# Patient Record
Sex: FEMALE | Race: WHITE | Employment: OTHER | URBAN - METROPOLITAN AREA
[De-identification: names, ages, dates, MRNs, and addresses within clinical notes are randomized per-mention and may not be internally consistent; named-entity substitution may affect disease eponyms.]

---

## 2017-02-28 ENCOUNTER — HOSPITAL ENCOUNTER (OUTPATIENT)
Dept: LAB | Age: 62
Discharge: HOME | End: 2017-02-28
Attending: NURSE PRACTITIONER | Admitting: NURSE PRACTITIONER

## 2017-02-28 LAB
ALBUMIN SERPL-MCNC: 4.9 GM/DL (ref 3.4–5)
ALP BLD-CCNC: 127 IU/L (ref 40–128)
ALT SERPL-CCNC: 59 U/L (ref 10–40)
ANION GAP SERPL CALCULATED.3IONS-SCNC: 12 MMOL/L (ref 4–16)
AST SERPL-CCNC: 38 IU/L (ref 15–37)
BILIRUB SERPL-MCNC: 0.2 MG/DL (ref 0–1)
BUN BLDV-MCNC: 8 MG/DL (ref 6–23)
CALCIUM SERPL-MCNC: 9.9 MG/DL (ref 8.3–10.6)
CHLORIDE BLD-SCNC: 96 MMOL/L (ref 99–110)
CHOLESTEROL: 214 MG/DL
CO2: 31 MMOL/L (ref 21–32)
CREAT SERPL-MCNC: 0.5 MG/DL (ref 0.6–1.1)
ESTIMATED AVERAGE GLUCOSE: 232 MG/DL
GFR AFRICAN AMERICAN: >60 ML/MIN/1.73M2
GFR NON-AFRICAN AMERICAN: >60 ML/MIN/1.73M2
GLUCOSE FASTING: 279 MG/DL (ref 70–99)
HBA1C MFR BLD: 9.7 % (ref 4.2–6.3)
HCT VFR BLD CALC: 45.4 % (ref 37–47)
HEMOGLOBIN: 13.7 GM/DL (ref 12.5–16)
MCH RBC QN AUTO: 26 PG (ref 27–31)
MCHC RBC AUTO-ENTMCNC: 30.2 % (ref 32–36)
MCV RBC AUTO: 86.3 FL (ref 78–100)
PDW BLD-RTO: 13.5 % (ref 11.7–14.9)
PLATELET # BLD: 246 K/CU MM (ref 140–440)
PMV BLD AUTO: 10.1 FL (ref 7.5–11.1)
POTASSIUM SERPL-SCNC: 5.1 MMOL/L (ref 3.5–5.1)
RBC # BLD: 5.26 M/CU MM (ref 4.2–5.4)
SODIUM BLD-SCNC: 139 MMOL/L (ref 135–145)
TOTAL PROTEIN: 7.1 GM/DL (ref 6.4–8.2)
TSH HIGH SENSITIVITY: 2.77 UIU/ML (ref 0.27–4.2)
WBC # BLD: 8.8 K/CU MM (ref 4–10.5)

## 2017-03-07 ENCOUNTER — HOSPITAL ENCOUNTER (OUTPATIENT)
Dept: MAMMOGRAPHY | Facility: HOSPITAL | Age: 62
Discharge: HOME | End: 2017-03-07
Attending: FAMILY MEDICINE

## 2017-03-07 DIAGNOSIS — Z12.31 VISIT FOR SCREENING MAMMOGRAM: ICD-10-CM

## 2017-03-20 ENCOUNTER — COMMUNICATION - HEALTHEAST (OUTPATIENT)
Dept: FAMILY MEDICINE | Facility: CLINIC | Age: 62
End: 2017-03-20

## 2017-03-24 ENCOUNTER — COMMUNICATION - HEALTHEAST (OUTPATIENT)
Dept: FAMILY MEDICINE | Facility: CLINIC | Age: 62
End: 2017-03-24

## 2017-04-10 ENCOUNTER — OFFICE VISIT - HEALTHEAST (OUTPATIENT)
Dept: FAMILY MEDICINE | Facility: CLINIC | Age: 62
End: 2017-04-10

## 2017-04-10 DIAGNOSIS — M81.0 OSTEOPOROSIS: ICD-10-CM

## 2017-04-10 DIAGNOSIS — Z00.00 HEALTH CARE MAINTENANCE: ICD-10-CM

## 2017-04-10 DIAGNOSIS — K64.4 EXTERNAL HEMORRHOID: ICD-10-CM

## 2017-04-10 DIAGNOSIS — F33.9 MAJOR DEPRESSIVE DISORDER, RECURRENT EPISODE (H): ICD-10-CM

## 2017-04-10 ASSESSMENT — MIFFLIN-ST. JEOR: SCORE: 1216.63

## 2017-04-19 ENCOUNTER — RECORDS - HEALTHEAST (OUTPATIENT)
Dept: ADMINISTRATIVE | Facility: OTHER | Age: 62
End: 2017-04-19

## 2017-07-27 ENCOUNTER — HOSPITAL ENCOUNTER (OUTPATIENT)
Dept: OTHER | Age: 62
Discharge: HOME | End: 2017-07-27
Attending: NURSE PRACTITIONER | Admitting: NURSE PRACTITIONER

## 2017-07-27 LAB
ESTIMATED AVERAGE GLUCOSE: 192 MG/DL
HBA1C MFR BLD: 8.3 % (ref 4.2–6.3)

## 2017-12-12 ASSESSMENT — MIFFLIN-ST. JEOR: SCORE: 1174.11

## 2017-12-13 ENCOUNTER — ANESTHESIA - HEALTHEAST (OUTPATIENT)
Dept: SURGERY | Facility: HOSPITAL | Age: 62
End: 2017-12-13

## 2017-12-13 ENCOUNTER — SURGERY - HEALTHEAST (OUTPATIENT)
Dept: SURGERY | Facility: HOSPITAL | Age: 62
End: 2017-12-13

## 2017-12-19 ENCOUNTER — COMMUNICATION - HEALTHEAST (OUTPATIENT)
Dept: FAMILY MEDICINE | Facility: CLINIC | Age: 62
End: 2017-12-19

## 2017-12-21 ENCOUNTER — RECORDS - HEALTHEAST (OUTPATIENT)
Dept: ADMINISTRATIVE | Facility: OTHER | Age: 62
End: 2017-12-21

## 2018-01-18 ENCOUNTER — RECORDS - HEALTHEAST (OUTPATIENT)
Dept: ADMINISTRATIVE | Facility: OTHER | Age: 63
End: 2018-01-18

## 2018-03-08 ENCOUNTER — RECORDS - HEALTHEAST (OUTPATIENT)
Dept: ADMINISTRATIVE | Facility: OTHER | Age: 63
End: 2018-03-08

## 2018-03-13 ENCOUNTER — HOSPITAL ENCOUNTER (OUTPATIENT)
Dept: GENERAL RADIOLOGY | Age: 63
Discharge: HOME | End: 2018-03-13

## 2018-03-13 ENCOUNTER — AMBULATORY - HEALTHEAST (OUTPATIENT)
Dept: SCHEDULING | Facility: CLINIC | Age: 63
End: 2018-03-13

## 2018-03-13 ENCOUNTER — AMBULATORY - HEALTHEAST (OUTPATIENT)
Dept: FAMILY MEDICINE | Facility: CLINIC | Age: 63
End: 2018-03-13

## 2018-03-13 DIAGNOSIS — M85.80 OSTEOPENIA: ICD-10-CM

## 2018-03-13 DIAGNOSIS — M24.9 JOINT DERANGEMENT, SHOULDER REGION: ICD-10-CM

## 2018-03-29 ENCOUNTER — HOSPITAL ENCOUNTER (OUTPATIENT)
Dept: MAMMOGRAPHY | Facility: CLINIC | Age: 63
Discharge: HOME | End: 2018-03-29
Attending: FAMILY MEDICINE

## 2018-03-29 DIAGNOSIS — Z12.31 VISIT FOR SCREENING MAMMOGRAM: ICD-10-CM

## 2018-04-24 ENCOUNTER — COMMUNICATION - HEALTHEAST (OUTPATIENT)
Dept: FAMILY MEDICINE | Facility: CLINIC | Age: 63
End: 2018-04-24

## 2018-05-01 ENCOUNTER — HOSPITAL ENCOUNTER (OUTPATIENT)
Dept: WOMENS IMAGING | Age: 63
Discharge: HOME | End: 2018-05-01
Attending: NURSE PRACTITIONER | Admitting: NURSE PRACTITIONER

## 2018-05-01 DIAGNOSIS — Z13.820 SCREENING FOR OSTEOPOROSIS: ICD-10-CM

## 2018-05-01 DIAGNOSIS — Z12.31 VISIT FOR SCREENING MAMMOGRAM: ICD-10-CM

## 2018-05-08 ENCOUNTER — HOSPITAL ENCOUNTER (OUTPATIENT)
Dept: MRI IMAGING | Age: 63
Discharge: HOME | End: 2018-05-08

## 2018-05-08 DIAGNOSIS — M50.30 OTHER CERVICAL DISC DEGENERATION, UNSPECIFIED CERVICAL REGION: ICD-10-CM

## 2018-07-16 ENCOUNTER — OFFICE VISIT - HEALTHEAST (OUTPATIENT)
Dept: FAMILY MEDICINE | Facility: CLINIC | Age: 63
End: 2018-07-16

## 2018-07-16 DIAGNOSIS — M81.0 OSTEOPOROSIS: ICD-10-CM

## 2018-07-16 DIAGNOSIS — Z00.00 HEALTH CARE MAINTENANCE: ICD-10-CM

## 2018-07-16 DIAGNOSIS — F33.0 MILD EPISODE OF RECURRENT MAJOR DEPRESSIVE DISORDER (H): ICD-10-CM

## 2018-07-16 LAB
CHOLEST SERPL-MCNC: 229 MG/DL
FASTING STATUS PATIENT QL REPORTED: YES
FASTING STATUS PATIENT QL REPORTED: YES
GLUCOSE BLD-MCNC: 79 MG/DL (ref 70–99)
HDLC SERPL-MCNC: 69 MG/DL
LDLC SERPL CALC-MCNC: 136 MG/DL
TRIGL SERPL-MCNC: 118 MG/DL

## 2018-07-16 ASSESSMENT — MIFFLIN-ST. JEOR: SCORE: 1190.27

## 2018-08-06 ENCOUNTER — HOSPITAL ENCOUNTER (OUTPATIENT)
Dept: PHYSICAL THERAPY | Age: 63
Discharge: HOME | End: 2018-08-31

## 2018-08-06 ASSESSMENT — PAIN DESCRIPTION - ONSET: ONSET: ON-GOING

## 2018-08-06 ASSESSMENT — PAIN DESCRIPTION - FREQUENCY: FREQUENCY: CONTINUOUS

## 2018-08-06 ASSESSMENT — PAIN DESCRIPTION - ORIENTATION: ORIENTATION: RIGHT;LEFT

## 2018-08-06 ASSESSMENT — PAIN DESCRIPTION - PAIN TYPE: TYPE: CHRONIC PAIN

## 2018-08-06 ASSESSMENT — PAIN SCALES - GENERAL: PAINLEVEL_OUTOF10: 7

## 2018-08-06 ASSESSMENT — PAIN DESCRIPTION - DESCRIPTORS: DESCRIPTORS: ACHING;THROBBING

## 2018-08-06 ASSESSMENT — PAIN DESCRIPTION - PROGRESSION: CLINICAL_PROGRESSION: GRADUALLY WORSENING

## 2018-08-06 ASSESSMENT — PAIN DESCRIPTION - LOCATION: LOCATION: BACK;LEG;NECK;SHOULDER

## 2018-08-06 NOTE — PROGRESS NOTES
restricted  Mobility: Walking and Moving Around Goal Status (): At least 20 percent but less than 40 percent impaired, limited or restricted    Goals  Long term goals  Time Frame for Long term goals : 6 Weeks  Long term goal 1: Pt will improve LE strength to at least 4+/5  Long term goal 2: Pt will have 25% reduction in pain  Long term goal 3: Pt will be independent with HEP  Long term goal 4: Pt will be able to transition to land based therapy.         Jacek Ferris, PT

## 2018-08-06 NOTE — PLAN OF CARE
Outpatient Physical Therapy           [place]           [] Phone: [phone]   Fax: [phone]  [facility]           [] Phone: [phone]   Fax: [phone]     To: Referring Practitioner: Bronson Patterson    From: Janel Dominguez, PT     Patient: Santa Graves       : 1955  Diagnosis: Diagnosis: Chronic pain syndrome   Treatment Diagnosis: Treatment Diagnosis: Decreased ROM, strength, functional mobility. Date: 2018     Physical Therapy Certification/Re-Certification Form  Dear Bronson Patterson  The following patient has been evaluated for physical therapy services and for therapy to continue, insurance requires physician review of the treatment plan initially and every 90 days. Please review the attached evaluation and/or summary of the patient's plan of care, and verify that you agree therapy should continue by signing the attached document and sending it back to our office. Assessment:  Antonieta Burger is a 58 y.o. female reporting to OP PT with c/c of LBP, neck pain and shoulder pain which has been occuring for many years. Pt is noted to have weakness in upper and lower extremities, decreased ROM and overall reduced ability to complete functional tasks secondary to pain. Pt can benefit from PT addressing deficits to help improve pain and activity tolerance. Patient agrees with established plan of care and assisted in the development of their short term and long term goals. Patient had no adverse reaction with initial treatment and there are no barriers to learning. Demonstrates no mental or cognitive disorder.       Plan of Care/Treatment to date:  [x] Therapeutic Exercise  [] Modalities:  [x] Therapeutic Activity     [] Ultrasound  [] Electrical Stimulation  [x] Gait Training      [] Cervical Traction [] Lumbar Traction  [x] Neuromuscular Re-education    [] Cold/hotpack [] Iontophoresis   [x] Instruction in HEP      [x] Vasopneumatic     [x] Manual Therapy               [] Aquatic Therapy ?      Frequency/Duration:  # Days per week: [] 1 day # Weeks: [] 1 week [] 5 weeks     [x] 2 days? [] 2 weeks [x] 6 weeks     [] 3 days   [] 3 weeks [] 7 weeks     [] 4 days   [] 4 weeks [] 8 weeks         [] 9 weeks [] 10 weeks         [] 11 weeks [] 12 weeks    Rehab Potential/Progress: [] Excellent [x] Good [] Fair  [] Poor     Goals:         Long term goals  Time Frame for Long term goals : 6 Weeks  Long term goal 1: Pt will improve LE strength to at least 4+/5  Long term goal 2: Pt will have 25% reduction in pain  Long term goal 3: Pt will be independent with HEP  Long term goal 4: Pt will be able to transition to land based therapy.      G-Code Selection: (On Eval and every 10th visit or Discharge)  MEASURE  [x] Mobility: Walking and Moving Around     [x] Current ()   [x] Goal ()   [] DC ()  [] Changing/Maintaining Body Position     [] Current (4648)      [] Goal ()   [] DC ()  [] Carrying / Moving / Handling Objects     [] Current ()   [] Goal ()   [] DC ()  [] Self-Care     [] Current ()   [] Goal ()   [] DC ()  [] Other PT/OT primary DX     [] Current ()   [] Goal ()   [] DC ()    SEVERITY  CURRENT  GOAL  DISCHARGE   [] CH (0% Impaired, Indep.)  [] CI (1-19% Impaired, SBA-CGA)  [] CJ (20-39% Impaired, MIN A)  [] CK  (40-59% Impairment, Mod A)  [x] CL  (60-79% Impairment, Max A)  [] CM  (80-99% Impairment, Dep.)   [] CN  (100% Impairment, Tot Dep.) [] CH (0% Impaired, Indep.)  [] CI (1-19% Impaired, SBA-CGA)  [x] CJ (20-39% Impaired, MIN A)  [] CK  (40-59% Impairment, Mod A)  [] CL  (60-79% Impairment, Max A)  [] CM  (80-99% Impairment, Dep.)   [] CN  (100% Impairment, Tot Dep.)  [] CH (0% Impaired, Indep.)  [] CI (1-19% Impaired, SBA-CGA)  [] CJ (20-39% Impaired, MIN A)  [] CK  (40-59% Impairment, Mod A)  [] CL  (60-79% Impairment, Max A)  [] CM  (80-99% Impairment, Dep.)   [] CN  (100% Impairment, Tot Dep.)          Electronically signed by:  Ronit Gray PT, 8/6/2018, 4:49 PM        If you have any questions or concerns, please don't hesitate to call.   Thank you for your referral.      Physician Signature:________________________________Date:_________ TIME: _____  By signing above, therapists plan is approved by physician

## 2018-09-01 ENCOUNTER — HOSPITAL ENCOUNTER (OUTPATIENT)
Dept: OTHER | Age: 63
Discharge: HOME | End: 2018-09-01

## 2018-10-19 ENCOUNTER — HOSPITAL ENCOUNTER (OUTPATIENT)
Dept: PHYSICAL THERAPY | Age: 63
Setting detail: THERAPIES SERIES
Discharge: HOME | End: 2018-10-19
Payer: MEDICARE

## 2018-10-19 PROCEDURE — 97113 AQUATIC THERAPY/EXERCISES: CPT

## 2018-10-19 NOTE — FLOWSHEET NOTE
Outpatient Physical Therapy           [place]           [] Phone: [phone]   Fax: [phone]  [facility]           [] Phone: [phone]   Fax: [phone]    Physical Therapy Daily Treatment Note  Date:  10/19/2018    Patient Name:  Everton Munoz    :  1955  MRN: [id number]  Restrictions/Precautions:  None  Diagnosis:   Chronic pain syndrome  Date of Surgery: None recent  Treatment Diagnosis:  Decreased ROM, strength, functional mobility. Insurance/Certification information:  Medicare   Referring Physician:   Rita Byers  Next Doctor Visit:    Plan of care signed (Y/N):  n  Visit# / total visits:       #4  Pain level: /10   Goals:       Long term goals  Time Frame for Long term goals : 6 Weeks  Long term goal 1: Pt will improve LE strength to at least 4+/5  Long term goal 2: Pt will have 25% reduction in pain  Long term goal 3: Pt will be independent with HEP  Long term goal 4: Pt will be able to transition to land based therapy. Subjective:  See aquatic flow sheet      Any changes in Ambulatory Summary Sheet?       Objective:  Pain 7/10    Exercises:  Exercise/Equipment Date  #2 Date 18 #3 Date 18 #4 2018  (5)  10-9-18 #1 10-12-18  #2 10-16-18#3 10-19-18 #4        Pool pool pool pool pool pool pool pool      See aquatic flow sheet for details See aquatic flow sheet for details See aquatic flow sheet for details See aquatic flow sheet for details See aquatic flow sheet for details See aquatic flow sheet for details See aquatic flow sheet for details See aquatic flow sheet for details                   TABLE               TE               GS   x10            Lumbar rotations   x10 B            SKTC   x10 B            Levator stretch 30\" B            Cervical retraction X20, 3\"            NR                                                              STANDING               TE                                                            TA

## 2018-11-06 ENCOUNTER — HOSPITAL ENCOUNTER (OUTPATIENT)
Dept: PHYSICAL THERAPY | Age: 63
Setting detail: THERAPIES SERIES
Discharge: HOME | End: 2018-11-06
Payer: MEDICARE

## 2018-11-06 PROCEDURE — 97113 AQUATIC THERAPY/EXERCISES: CPT

## 2019-07-15 ENCOUNTER — HOSPITAL ENCOUNTER (OUTPATIENT)
Age: 64
Discharge: HOME | End: 2019-07-15
Payer: MEDICARE

## 2019-07-15 LAB
ALBUMIN SERPL-MCNC: 4.7 GM/DL (ref 3.4–5)
ALP BLD-CCNC: 86 IU/L (ref 40–128)
ALT SERPL-CCNC: 25 U/L (ref 10–40)
ANION GAP SERPL CALCULATED.3IONS-SCNC: 13 MMOL/L (ref 4–16)
AST SERPL-CCNC: 17 IU/L (ref 15–37)
BASOPHILS ABSOLUTE: 0 K/CU MM
BASOPHILS RELATIVE PERCENT: 0.3 % (ref 0–1)
BILIRUB SERPL-MCNC: 0.2 MG/DL (ref 0–1)
BUN BLDV-MCNC: 10 MG/DL (ref 6–23)
CALCIUM SERPL-MCNC: 9.1 MG/DL (ref 8.3–10.6)
CHLORIDE BLD-SCNC: 99 MMOL/L (ref 99–110)
CHOLESTEROL: 200 MG/DL
CO2: 26 MMOL/L (ref 21–32)
CREAT SERPL-MCNC: 0.5 MG/DL (ref 0.6–1.1)
DIFFERENTIAL TYPE: ABNORMAL
EOSINOPHILS ABSOLUTE: 0.2 K/CU MM
EOSINOPHILS RELATIVE PERCENT: 1.9 % (ref 0–3)
ESTIMATED AVERAGE GLUCOSE: 180 MG/DL
GFR AFRICAN AMERICAN: >60 ML/MIN/1.73M2
GFR NON-AFRICAN AMERICAN: >60 ML/MIN/1.73M2
GLUCOSE BLD-MCNC: 221 MG/DL (ref 70–99)
HBA1C MFR BLD: 7.9 % (ref 4.2–6.3)
HCT VFR BLD CALC: 44 % (ref 37–47)
HDLC SERPL-MCNC: 39 MG/DL
HEMOGLOBIN: 13.8 GM/DL (ref 12.5–16)
IMMATURE NEUTROPHIL %: 0.3 % (ref 0–0.43)
LDL CHOLESTEROL DIRECT: 157 MG/DL
LYMPHOCYTES ABSOLUTE: 3.9 K/CU MM
LYMPHOCYTES RELATIVE PERCENT: 41.3 % (ref 24–44)
MCH RBC QN AUTO: 27.8 PG (ref 27–31)
MCHC RBC AUTO-ENTMCNC: 31.4 % (ref 32–36)
MCV RBC AUTO: 88.7 FL (ref 78–100)
MONOCYTES ABSOLUTE: 0.7 K/CU MM
MONOCYTES RELATIVE PERCENT: 7.1 % (ref 0–4)
NUCLEATED RBC %: 0 %
PDW BLD-RTO: 14 % (ref 11.7–14.9)
PLATELET # BLD: 246 K/CU MM (ref 140–440)
PMV BLD AUTO: 10.2 FL (ref 7.5–11.1)
POTASSIUM SERPL-SCNC: 4.7 MMOL/L (ref 3.5–5.1)
RBC # BLD: 4.96 M/CU MM (ref 4.2–5.4)
SEGMENTED NEUTROPHILS ABSOLUTE COUNT: 4.6 K/CU MM
SEGMENTED NEUTROPHILS RELATIVE PERCENT: 49.1 % (ref 36–66)
SODIUM BLD-SCNC: 138 MMOL/L (ref 135–145)
T4 FREE: 1.01 NG/DL (ref 0.9–1.8)
TOTAL IMMATURE NEUTOROPHIL: 0.03 K/CU MM
TOTAL NUCLEATED RBC: 0 K/CU MM
TOTAL PROTEIN: 6.8 GM/DL (ref 6.4–8.2)
TRIGL SERPL-MCNC: 202 MG/DL
TSH HIGH SENSITIVITY: 1.78 UIU/ML (ref 0.27–4.2)
WBC # BLD: 9.5 K/CU MM (ref 4–10.5)

## 2019-07-15 PROCEDURE — 83721 ASSAY OF BLOOD LIPOPROTEIN: CPT

## 2019-07-15 PROCEDURE — 84443 ASSAY THYROID STIM HORMONE: CPT

## 2019-07-15 PROCEDURE — 80053 COMPREHEN METABOLIC PANEL: CPT

## 2019-07-15 PROCEDURE — 36415 COLL VENOUS BLD VENIPUNCTURE: CPT

## 2019-07-15 PROCEDURE — 84439 ASSAY OF FREE THYROXINE: CPT

## 2019-07-15 PROCEDURE — 83036 HEMOGLOBIN GLYCOSYLATED A1C: CPT

## 2019-07-15 PROCEDURE — 80061 LIPID PANEL: CPT

## 2019-07-15 PROCEDURE — 85025 COMPLETE CBC W/AUTO DIFF WBC: CPT

## 2019-07-19 ENCOUNTER — OFFICE VISIT - HEALTHEAST (OUTPATIENT)
Dept: FAMILY MEDICINE | Facility: CLINIC | Age: 64
End: 2019-07-19

## 2019-07-19 DIAGNOSIS — Z13.220 ENCOUNTER FOR SCREENING FOR LIPOID DISORDERS: ICD-10-CM

## 2019-07-19 DIAGNOSIS — Z13.1 ENCOUNTER FOR SCREENING FOR DIABETES MELLITUS: ICD-10-CM

## 2019-07-19 DIAGNOSIS — Z00.00 ANNUAL PHYSICAL EXAM: ICD-10-CM

## 2019-07-19 DIAGNOSIS — F33.0 MILD EPISODE OF RECURRENT MAJOR DEPRESSIVE DISORDER (H): ICD-10-CM

## 2019-07-19 ASSESSMENT — MIFFLIN-ST. JEOR: SCORE: 1215.98

## 2019-08-01 ENCOUNTER — AMBULATORY - HEALTHEAST (OUTPATIENT)
Dept: LAB | Facility: CLINIC | Age: 64
End: 2019-08-01

## 2019-08-01 DIAGNOSIS — Z13.1 ENCOUNTER FOR SCREENING FOR DIABETES MELLITUS: ICD-10-CM

## 2019-08-01 DIAGNOSIS — Z13.220 ENCOUNTER FOR SCREENING FOR LIPOID DISORDERS: ICD-10-CM

## 2019-08-01 LAB
CHOLEST SERPL-MCNC: 213 MG/DL
FASTING STATUS PATIENT QL REPORTED: YES
FASTING STATUS PATIENT QL REPORTED: YES
GLUCOSE BLD-MCNC: 89 MG/DL (ref 70–99)
HBA1C MFR BLD: 5.4 % (ref 3.5–6)
HDLC SERPL-MCNC: 72 MG/DL
LDLC SERPL CALC-MCNC: 120 MG/DL
TRIGL SERPL-MCNC: 103 MG/DL

## 2019-08-12 ENCOUNTER — COMMUNICATION - HEALTHEAST (OUTPATIENT)
Dept: FAMILY MEDICINE | Facility: CLINIC | Age: 64
End: 2019-08-12

## 2019-08-12 DIAGNOSIS — F33.0 MILD EPISODE OF RECURRENT MAJOR DEPRESSIVE DISORDER (H): ICD-10-CM

## 2019-11-13 ENCOUNTER — COMMUNICATION - HEALTHEAST (OUTPATIENT)
Dept: FAMILY MEDICINE | Facility: CLINIC | Age: 64
End: 2019-11-13

## 2019-11-13 DIAGNOSIS — F33.0 MILD EPISODE OF RECURRENT MAJOR DEPRESSIVE DISORDER (H): ICD-10-CM

## 2020-02-26 ENCOUNTER — HOSPITAL ENCOUNTER (OUTPATIENT)
Dept: MRI IMAGING | Age: 65
Discharge: HOME | End: 2020-02-26
Payer: MEDICARE

## 2020-02-26 PROCEDURE — 72148 MRI LUMBAR SPINE W/O DYE: CPT

## 2020-08-18 ENCOUNTER — COMMUNICATION - HEALTHEAST (OUTPATIENT)
Dept: FAMILY MEDICINE | Facility: CLINIC | Age: 65
End: 2020-08-18

## 2020-08-18 DIAGNOSIS — F33.0 MILD EPISODE OF RECURRENT MAJOR DEPRESSIVE DISORDER (H): ICD-10-CM

## 2020-10-30 ENCOUNTER — HOSPITAL ENCOUNTER (OUTPATIENT)
Age: 65
Discharge: HOME | End: 2020-10-30
Payer: MEDICARE

## 2020-10-30 LAB
ANION GAP SERPL CALCULATED.3IONS-SCNC: 14 MMOL/L (ref 4–16)
BUN BLDV-MCNC: 7 MG/DL (ref 6–23)
CALCIUM SERPL-MCNC: 9.8 MG/DL (ref 8.3–10.6)
CHLORIDE BLD-SCNC: 98 MMOL/L (ref 99–110)
CO2: 25 MMOL/L (ref 21–32)
CREAT SERPL-MCNC: 0.5 MG/DL (ref 0.6–1.1)
CREATININE URINE: 87.6 MG/DL (ref 28–217)
ESTIMATED AVERAGE GLUCOSE: 183 MG/DL
GFR AFRICAN AMERICAN: >60 ML/MIN/1.73M2
GFR NON-AFRICAN AMERICAN: >60 ML/MIN/1.73M2
GLUCOSE BLD-MCNC: 236 MG/DL (ref 70–99)
HBA1C MFR BLD: 8 % (ref 4.2–6.3)
MICROALBUMIN/CREAT 24H UR: 2.4 MG/DL
MICROALBUMIN/CREAT UR-RTO: 27.4 MG/G CREAT (ref 0–30)
POTASSIUM SERPL-SCNC: 4.6 MMOL/L (ref 3.5–5.1)
SODIUM BLD-SCNC: 137 MMOL/L (ref 135–145)

## 2020-10-30 PROCEDURE — 83036 HEMOGLOBIN GLYCOSYLATED A1C: CPT

## 2020-10-30 PROCEDURE — 80048 BASIC METABOLIC PNL TOTAL CA: CPT

## 2020-10-30 PROCEDURE — 82043 UR ALBUMIN QUANTITATIVE: CPT

## 2020-10-30 PROCEDURE — 36415 COLL VENOUS BLD VENIPUNCTURE: CPT

## 2020-10-30 PROCEDURE — 82570 ASSAY OF URINE CREATININE: CPT

## 2020-11-30 ENCOUNTER — COMMUNICATION - HEALTHEAST (OUTPATIENT)
Dept: FAMILY MEDICINE | Facility: CLINIC | Age: 65
End: 2020-11-30

## 2020-11-30 DIAGNOSIS — F33.0 MILD EPISODE OF RECURRENT MAJOR DEPRESSIVE DISORDER (H): ICD-10-CM

## 2020-12-15 ENCOUNTER — AMBULATORY - HEALTHEAST (OUTPATIENT)
Dept: SCHEDULING | Facility: CLINIC | Age: 65
End: 2020-12-15

## 2020-12-15 ENCOUNTER — OFFICE VISIT - HEALTHEAST (OUTPATIENT)
Dept: FAMILY MEDICINE | Facility: CLINIC | Age: 65
End: 2020-12-15

## 2020-12-15 DIAGNOSIS — Z12.11 SPECIAL SCREENING FOR MALIGNANT NEOPLASMS, COLON: ICD-10-CM

## 2020-12-15 DIAGNOSIS — M81.0 AGE-RELATED OSTEOPOROSIS WITHOUT CURRENT PATHOLOGICAL FRACTURE: ICD-10-CM

## 2020-12-15 DIAGNOSIS — Z12.31 ENCOUNTER FOR SCREENING MAMMOGRAM FOR BREAST CANCER: ICD-10-CM

## 2020-12-15 DIAGNOSIS — F33.0 MILD EPISODE OF RECURRENT MAJOR DEPRESSIVE DISORDER (H): ICD-10-CM

## 2020-12-15 RX ORDER — SERTRALINE HYDROCHLORIDE 100 MG/1
100 TABLET, FILM COATED ORAL DAILY
Qty: 90 TABLET | Refills: 3 | Status: SHIPPED | OUTPATIENT
Start: 2020-12-15 | End: 2022-02-03

## 2020-12-15 ASSESSMENT — ANXIETY QUESTIONNAIRES
6. BECOMING EASILY ANNOYED OR IRRITABLE: NOT AT ALL
2. NOT BEING ABLE TO STOP OR CONTROL WORRYING: NOT AT ALL
GAD7 TOTAL SCORE: 0
5. BEING SO RESTLESS THAT IT IS HARD TO SIT STILL: NOT AT ALL
7. FEELING AFRAID AS IF SOMETHING AWFUL MIGHT HAPPEN: NOT AT ALL
3. WORRYING TOO MUCH ABOUT DIFFERENT THINGS: NOT AT ALL
4. TROUBLE RELAXING: NOT AT ALL
1. FEELING NERVOUS, ANXIOUS, OR ON EDGE: NOT AT ALL

## 2020-12-15 ASSESSMENT — PATIENT HEALTH QUESTIONNAIRE - PHQ9: SUM OF ALL RESPONSES TO PHQ QUESTIONS 1-9: 1

## 2020-12-17 ENCOUNTER — HOSPITAL ENCOUNTER (OUTPATIENT)
Dept: MAMMOGRAPHY | Facility: CLINIC | Age: 65
Discharge: HOME | End: 2020-12-17

## 2020-12-17 DIAGNOSIS — Z12.31 ENCOUNTER FOR SCREENING MAMMOGRAM FOR BREAST CANCER: ICD-10-CM

## 2020-12-30 ENCOUNTER — HOSPITAL ENCOUNTER (OUTPATIENT)
Dept: MAMMOGRAPHY | Facility: CLINIC | Age: 65
Discharge: HOME | End: 2020-12-30

## 2020-12-30 ENCOUNTER — AMBULATORY - HEALTHEAST (OUTPATIENT)
Dept: MAMMOGRAPHY | Facility: CLINIC | Age: 65
End: 2020-12-30

## 2020-12-30 DIAGNOSIS — N64.89 BREAST ASYMMETRY: ICD-10-CM

## 2020-12-30 DIAGNOSIS — Z11.59 ENCOUNTER FOR SCREENING FOR OTHER VIRAL DISEASES: ICD-10-CM

## 2021-01-08 ENCOUNTER — AMBULATORY - HEALTHEAST (OUTPATIENT)
Dept: LAB | Facility: CLINIC | Age: 66
End: 2021-01-08

## 2021-01-08 DIAGNOSIS — Z11.59 ENCOUNTER FOR SCREENING FOR OTHER VIRAL DISEASES: ICD-10-CM

## 2021-01-10 ENCOUNTER — COMMUNICATION - HEALTHEAST (OUTPATIENT)
Dept: SCHEDULING | Facility: CLINIC | Age: 66
End: 2021-01-10

## 2021-01-11 ENCOUNTER — HOSPITAL ENCOUNTER (OUTPATIENT)
Dept: MAMMOGRAPHY | Facility: CLINIC | Age: 66
Discharge: HOME | End: 2021-01-11

## 2021-01-11 DIAGNOSIS — N63.20 BREAST MASS, LEFT: ICD-10-CM

## 2021-01-11 DIAGNOSIS — R92.8 OTHER ABNORMAL AND INCONCLUSIVE FINDINGS ON DIAGNOSTIC IMAGING OF BREAST: ICD-10-CM

## 2021-01-13 ENCOUNTER — COMMUNICATION - HEALTHEAST (OUTPATIENT)
Dept: MAMMOGRAPHY | Facility: CLINIC | Age: 66
End: 2021-01-13

## 2021-01-13 LAB
LAB AP CHARGES (HE HISTORICAL CONVERSION): NORMAL
PATH REPORT.COMMENTS IMP SPEC: NORMAL
PATH REPORT.COMMENTS IMP SPEC: NORMAL
PATH REPORT.FINAL DX SPEC: NORMAL
PATH REPORT.GROSS SPEC: NORMAL
PATH REPORT.MICROSCOPIC SPEC OTHER STN: NORMAL
PATH REPORT.RELEVANT HX SPEC: NORMAL
RESULT FLAG (HE HISTORICAL CONVERSION): NORMAL

## 2021-02-21 ENCOUNTER — HOSPITAL ENCOUNTER (EMERGENCY)
Age: 66
Discharge: LEFT BEFORE DISCHARGE | End: 2021-02-21
Payer: MEDICARE

## 2021-02-21 VITALS
WEIGHT: 180 LBS | BODY MASS INDEX: 36.29 KG/M2 | DIASTOLIC BLOOD PRESSURE: 105 MMHG | SYSTOLIC BLOOD PRESSURE: 170 MMHG | OXYGEN SATURATION: 94 % | RESPIRATION RATE: 18 BRPM | HEIGHT: 59 IN | HEART RATE: 88 BPM

## 2021-02-21 PROCEDURE — 93005 ELECTROCARDIOGRAM TRACING: CPT | Performed by: EMERGENCY MEDICINE

## 2021-02-22 NOTE — ED NOTES
Pt states they do not want to wait any longer. Pt leaving at this time.       Antonieta Serrano RN  02/22/21 3015

## 2021-02-23 LAB
EKG ATRIAL RATE: 86 BPM
EKG DIAGNOSIS: NORMAL
EKG P AXIS: 47 DEGREES
EKG P-R INTERVAL: 136 MS
EKG Q-T INTERVAL: 382 MS
EKG QRS DURATION: 72 MS
EKG QTC CALCULATION (BAZETT): 457 MS
EKG R AXIS: -37 DEGREES
EKG T AXIS: 60 DEGREES
EKG VENTRICULAR RATE: 86 BPM

## 2021-02-23 PROCEDURE — 93010 ELECTROCARDIOGRAM REPORT: CPT | Performed by: INTERNAL MEDICINE

## 2021-02-25 ENCOUNTER — HOSPITAL ENCOUNTER (OUTPATIENT)
Age: 66
Discharge: HOME | End: 2021-02-25
Payer: MEDICARE

## 2021-02-25 LAB
ALBUMIN SERPL-MCNC: 4.7 GM/DL (ref 3.4–5)
ALP BLD-CCNC: 97 IU/L (ref 40–129)
ALT SERPL-CCNC: 39 U/L (ref 10–40)
ANION GAP SERPL CALCULATED.3IONS-SCNC: 16 MMOL/L (ref 4–16)
AST SERPL-CCNC: 28 IU/L (ref 15–37)
ATYPICAL LYMPHOCYTE ABSOLUTE COUNT: ABNORMAL
BANDED NEUTROPHILS ABSOLUTE COUNT: 0.35 K/CU MM
BANDED NEUTROPHILS RELATIVE PERCENT: 3 % (ref 5–11)
BILIRUB SERPL-MCNC: 0.2 MG/DL (ref 0–1)
BUN BLDV-MCNC: 13 MG/DL (ref 6–23)
CALCIUM SERPL-MCNC: 9.9 MG/DL (ref 8.3–10.6)
CHLORIDE BLD-SCNC: 91 MMOL/L (ref 99–110)
CHOLESTEROL: 255 MG/DL
CO2: 24 MMOL/L (ref 21–32)
CREAT SERPL-MCNC: 0.6 MG/DL (ref 0.6–1.1)
DIFFERENTIAL TYPE: ABNORMAL
EOSINOPHILS ABSOLUTE: 0.1 K/CU MM
EOSINOPHILS RELATIVE PERCENT: 1 % (ref 0–3)
ESTIMATED AVERAGE GLUCOSE: 203 MG/DL
GFR AFRICAN AMERICAN: >60 ML/MIN/1.73M2
GFR NON-AFRICAN AMERICAN: >60 ML/MIN/1.73M2
GLUCOSE BLD-MCNC: 320 MG/DL (ref 70–99)
HBA1C MFR BLD: 8.7 % (ref 4.2–6.3)
HCT VFR BLD CALC: 49.4 % (ref 37–47)
HDLC SERPL-MCNC: 36 MG/DL
HEMOGLOBIN: 15.6 GM/DL (ref 12.5–16)
LDL CHOLESTEROL DIRECT: 194 MG/DL
LYMPHOCYTES ABSOLUTE: 4.4 K/CU MM
LYMPHOCYTES RELATIVE PERCENT: 38 % (ref 24–44)
MCH RBC QN AUTO: 27.7 PG (ref 27–31)
MCHC RBC AUTO-ENTMCNC: 31.6 % (ref 32–36)
MCV RBC AUTO: 87.7 FL (ref 78–100)
METAMYELOCYTES ABSOLUTE COUNT: 0.12 K/CU MM
METAMYELOCYTES PERCENT: 1 %
MONOCYTES ABSOLUTE: 0.5 K/CU MM
MONOCYTES RELATIVE PERCENT: 4 % (ref 0–4)
PDW BLD-RTO: 13.2 % (ref 11.7–14.9)
PLATELET # BLD: 305 K/CU MM (ref 140–440)
PLT MORPHOLOGY: ABNORMAL
PMV BLD AUTO: 11.2 FL (ref 7.5–11.1)
POTASSIUM SERPL-SCNC: 5.3 MMOL/L (ref 3.5–5.1)
RBC # BLD: 5.63 M/CU MM (ref 4.2–5.4)
SEGMENTED NEUTROPHILS ABSOLUTE COUNT: 6.1 K/CU MM
SEGMENTED NEUTROPHILS RELATIVE PERCENT: 53 % (ref 36–66)
SODIUM BLD-SCNC: 131 MMOL/L (ref 135–145)
T4 FREE: 1.11 NG/DL (ref 0.9–1.8)
TOTAL PROTEIN: 7.5 GM/DL (ref 6.4–8.2)
TRIGL SERPL-MCNC: 250 MG/DL
TSH HIGH SENSITIVITY: 2.35 UIU/ML (ref 0.27–4.2)
WBC # BLD: 11.6 K/CU MM (ref 4–10.5)

## 2021-02-25 PROCEDURE — 80061 LIPID PANEL: CPT

## 2021-02-25 PROCEDURE — 85007 BL SMEAR W/DIFF WBC COUNT: CPT

## 2021-02-25 PROCEDURE — 80053 COMPREHEN METABOLIC PANEL: CPT

## 2021-02-25 PROCEDURE — 85027 COMPLETE CBC AUTOMATED: CPT

## 2021-02-25 PROCEDURE — 83036 HEMOGLOBIN GLYCOSYLATED A1C: CPT

## 2021-02-25 PROCEDURE — 84443 ASSAY THYROID STIM HORMONE: CPT

## 2021-02-25 PROCEDURE — 84439 ASSAY OF FREE THYROXINE: CPT

## 2021-02-25 PROCEDURE — 83721 ASSAY OF BLOOD LIPOPROTEIN: CPT

## 2021-02-25 PROCEDURE — 36415 COLL VENOUS BLD VENIPUNCTURE: CPT

## 2021-03-01 ENCOUNTER — TELEPHONE (OUTPATIENT)
Dept: CARDIOLOGY CLINIC | Age: 66
End: 2021-03-01

## 2021-03-04 ENCOUNTER — INITIAL CONSULT (OUTPATIENT)
Dept: CARDIOLOGY CLINIC | Age: 66
End: 2021-03-04
Payer: MEDICARE

## 2021-03-04 ENCOUNTER — NURSE ONLY (OUTPATIENT)
Dept: CARDIOLOGY CLINIC | Age: 66
End: 2021-03-04
Payer: MEDICARE

## 2021-03-04 VITALS
SYSTOLIC BLOOD PRESSURE: 142 MMHG | DIASTOLIC BLOOD PRESSURE: 92 MMHG | BODY MASS INDEX: 35.44 KG/M2 | HEART RATE: 106 BPM | HEIGHT: 59 IN | WEIGHT: 175.8 LBS

## 2021-03-04 DIAGNOSIS — R06.02 SHORTNESS OF BREATH: ICD-10-CM

## 2021-03-04 DIAGNOSIS — R06.02 SHORTNESS OF BREATH: Primary | ICD-10-CM

## 2021-03-04 DIAGNOSIS — R00.2 PALPITATIONS: ICD-10-CM

## 2021-03-04 DIAGNOSIS — I15.0 RENOVASCULAR HYPERTENSION: ICD-10-CM

## 2021-03-04 DIAGNOSIS — R42 DIZZINESS: ICD-10-CM

## 2021-03-04 DIAGNOSIS — R00.2 PALPITATIONS: Primary | ICD-10-CM

## 2021-03-04 PROCEDURE — G8419 CALC BMI OUT NRM PARAM NOF/U: HCPCS | Performed by: INTERNAL MEDICINE

## 2021-03-04 PROCEDURE — G8484 FLU IMMUNIZE NO ADMIN: HCPCS | Performed by: INTERNAL MEDICINE

## 2021-03-04 PROCEDURE — 99204 OFFICE O/P NEW MOD 45 MIN: CPT | Performed by: INTERNAL MEDICINE

## 2021-03-04 PROCEDURE — 93228 REMOTE 30 DAY ECG REV/REPORT: CPT | Performed by: INTERNAL MEDICINE

## 2021-03-04 PROCEDURE — 1090F PRES/ABSN URINE INCON ASSESS: CPT | Performed by: INTERNAL MEDICINE

## 2021-03-04 PROCEDURE — G8427 DOCREV CUR MEDS BY ELIG CLIN: HCPCS | Performed by: INTERNAL MEDICINE

## 2021-03-04 RX ORDER — GLIMEPIRIDE 2 MG/1
2 TABLET ORAL
COMMUNITY

## 2021-03-04 RX ORDER — LISINOPRIL 40 MG/1
40 TABLET ORAL EVERY MORNING
Qty: 90 TABLET | Refills: 3 | Status: ON HOLD | OUTPATIENT
Start: 2021-03-04 | End: 2022-03-23 | Stop reason: HOSPADM

## 2021-03-04 RX ORDER — GABAPENTIN 400 MG/1
400 CAPSULE ORAL EVERY EVENING
COMMUNITY

## 2021-03-04 RX ORDER — POLYETHYLENE GLYCOL 3350 17 G/17G
17 POWDER, FOR SOLUTION ORAL DAILY
COMMUNITY

## 2021-03-04 NOTE — PROGRESS NOTES
CARDIOLOGY CONSULT NOTE   Reason for consultation:  Dizziness, Headaches, HTN    Referring physician:  Toribio Arocs CNP    Primary care physician: MARCE Parker - CNP      Dear [employer]  Thanks for the consult. History of present illness:Luna is a 72 y. o.year old who  presents with uncontrolled HTN, her blood pressure was greater than 200 , she used to be on lisinopril 10 mg for 20 yrs, she started to have headaches and dizziness, she started HCTZ 2 weeks ago and increased lisinopril to 20mg, she denied chest pain, however, feels shortness of breath with exertion,her shortness of breath is intermittent, self limiting, not associated with cough or fever, gets worse with activity and better with rest, she feels dizzy when she stands up or lays down,she feels like room is spinning, she also feels palptiations when she is dizzy. She also has headache, maintly frontal region, 7/10, sharp, internittent, gets better with sleeping and laying down. Past medical history:    has a past medical history of Anxiety, Bipolar disorder ([address] Choking, Chronic back pain, Depression, Diabetes mellitus ([address] Difficult intubation, Fibromyalgia, Hyperlipidemia, Hypertension, Panic attacks, Pneumonia, PTSD (post-traumatic stress disorder), and Shortness of breath. Past surgical history:   has a past surgical history that includes eye surgery (Bilateral, 2000's); back surgery (2003); HYSTERECTOMY VAGINAL (1980's); other surgical history (1990's); hernia repair (1970's); Endoscopy, colon, diagnostic (Last Done 3-17); Rotator cuff repair (Right, 2014); knee surgery (Right, 2014); Leg Surgery (Right, 2000's); and Upper gastrointestinal endoscopy (05/11/2017). Social History:   reports that she has been smoking cigarettes. She started smoking about 48 years ago. She has a 22.00 pack-year smoking history. She has never used smokeless tobacco. She reports that she does not drink alcohol or use drugs.   Family history:   no family history of CAD, STROKE of DM    No Known Allergies    No current facility-administered medications for this visit. Current Outpatient Medications   Medication Sig Dispense Refill    gabapentin (NEURONTIN) 400 MG capsule 400 mg.  polyethylene glycol (GLYCOLAX) 17 GM/SCOOP powder Take 17 g by mouth daily      glimepiride (AMARYL) 2 MG tablet Take 2 mg by mouth every morning (before breakfast)      diazepam (VALIUM) 5 MG tablet Take 5 mg by mouth every 6 hours as needed for Anxiety      omeprazole (PRILOSEC) 10 MG delayed release capsule Take 10 mg by mouth daily      lisinopril (PRINIVIL;ZESTRIL) 10 MG tablet Take 10 mg by mouth every morning      metFORMIN (GLUCOPHAGE) 1000 MG tablet Take 1,000 mg by mouth 2 times daily (with meals)      PARoxetine (PAXIL) 10 MG tablet Take 10 mg by mouth every morning.  oxyCODONE-acetaminophen (PERCOCET) 7.5-325 MG per tablet Take 1 tablet by mouth as needed  . No current facility-administered medications for this visit. Review of Systems:   · Constitutional: No Fever or Weight Loss   · Eyes: No Decreased Vision  · ENT: + Headaches, Hearing Loss or Vertigo  · Cardiovascular: No chest pain,+ dyspnea on exertion,+ palpitations or loss of consciousness  · Respiratory: No cough or wheezing    · Gastrointestinal: No abdominal pain, appetite loss, blood in stools, constipation, diarrhea or heartburn  · Genitourinary: No dysuria, trouble voiding, or hematuria  · Musculoskeletal:  No gait disturbance, weakness or joint complaints  · Integumentary: No rash or pruritis  · Neurological: No TIA or stroke symptoms  · Psychiatric: No anxiety or depression  · Endocrine: No malaise, fatigue or temperature intolerance  · Hematologic/Lymphatic: No bleeding problems, blood clots or swollen lymph nodes  · Allergic/Immunologic: No nasal congestion or hives  All systems negative except as marked.      ·   ·      Physical Examination:    Vitals:    03/04/21 0845 BP: (!) 142/92   Pulse: 106      Wt Readings from Last 3 Encounters:   03/04/21 175 lb 12.8 oz (79.7 kg)   05/11/17 180 lb (81.6 kg)     Body mass index is 35.51 kg/m². General Appearance:  No distress, conversant    Constitutional:  Well developed, Well nourished, No acute distress, Non-toxic appearance. HENT:  Normocephalic, Atraumatic, Bilateral external ears normal, Oropharynx moist, No oral exudates, Nose normal. Neck- Normal range of motion, No tenderness, Supple, No stridor,no apical-carotid delay, no carotid bruit  Eyes:  PERRL, EOMI, Conjunctiva normal, No discharge. Respiratory:  Normal breath sounds, No respiratory distress, No wheezing, No chest tenderness. ,no use of accessory muscles, diaphragm movement is normal  Cardiovascular: (PMI) apex non displaced,no lifts no thrills, no s3,no s4, Normal heart rate, Normal rhythm, No murmurs, No rubs, No gallops. Carotid arteries pulse and amplitude are normal no bruit, no abdominal bruit noted ( normal abdominal aorta ausculation), femoral arteries pulse and amplitude are normal no bruit, pedal pulses are normal  GI:  Bowel sounds normal, Soft, No tenderness, No masses, No pulsatile masses, no hepatosplenomegally, no bruits  : External genitalia appear normal, No masses or lesions. No discharge. No CVA tenderness. Musculoskeletal:  Intact distal pulses, No edema, No tenderness, No cyanosis, No clubbing. Good range of motion in all major joints. No tenderness to palpation or major deformities noted. Back- No tenderness. Integument:  Warm, Dry, No erythema, No rash. Skin: no rash, no ulcers  Lymphatic:  No lymphadenopathy noted. Neurologic:  Alert & oriented x 3, Normal motor function, Normal sensory function, No focal deficits noted. Psychiatric:  Affect normal, Judgment normal, Mood normal.   Lab Review   No results for input(s): WBC, HGB, HCT, PLT in the last 72 hours.    No results for input(s): NA, K, CL, CO2, PHOS, BUN, CREATININE in the last 72 hours. Invalid input(s): CA  No results for input(s): AST, ALT, ALB, BILIDIR, BILITOT, ALKPHOS in the last 72 hours. No results for input(s): TROPONINI in the last 72 hours. No results found for: BNP  No results found for: INR, PROTIME      EKG:nsr    Chest Xray:    ECHO:pending  Labs, echo, meds reviewed  Assessment: 72 y. o.year old with PMH of  has a past medical history of Anxiety, Bipolar disorder ([facility] [address] Choking, Chronic back pain, Depression, Diabetes mellitus ([facility] [address] Difficult intubation, Fibromyalgia, Hyperlipidemia, Hypertension, Panic attacks, Pneumonia, PTSD (post-traumatic stress disorder), and Shortness of breath. Recommendations:      1. HTN: still not at goal, increase lisinopril to 40mg daily and continue HCTZ, recommend to check daily blood pressure at home, it needed, will add norvasc, Avoid red meat, processed meat, and salt,start exercise, lose weight, increase fresh fruits, green vegetable and nutsm check chem 7 and renal doppler to r/o renal artery stenosis. RECOMMEND TO STOP SMOKING  Palpitations: 14 days event monitor ordered  Dizziness: most likley from uncontrolled HTN, will get carotid doppler and if dizziness does not improve, will need CT head to r/o TIA, add babay aspitin and statins  Shortness of breath with exertion: stress test and echo ordrered  DM: not controlled, continue metformin and amaryl  Dyslipidemia: start statin  All labs, medications and tests reviewed, continue all other medications of all above medical condition listed as is.          Eddie Pichardo MD, 3/4/2021 9:02 AM

## 2021-03-04 NOTE — PROGRESS NOTES
14 days e-cardio monitor placed.  # [id number]. Instructed patient on how to record the event and to call monitoring center at [phone] if any problems arise. Instructed patient to disconnect the lead wires from the electrodes before bathing or showering and reattach them afterwards. Instructed patient that the electrodes should be changed every 3 days or if they no longer adhere to the skin. Patient to mail package after the monitor has ended. Patient verbalized understanding.

## 2021-03-04 NOTE — PATIENT INSTRUCTIONS
Please hold on to these instructions the  will call you within 1-9 business days when we receive authorization from your insurance. Nuclear Stress Test    WHAT TO EXPECT:   ? You will need to confirm the test or it could be cancelled. ? This test will take approximately 2 hours: 1 hour in the AM &    1 hour in the PM. You will be given a time by the Technologist after the first part is completed to come back. ? You will be given a medication, through an IV in the hand, this will safely simulate exercise. This IV is also needed to inject the radioactive isotope unless you are able toe walk the treadmill. ? You will receive an injection in the AM & PM before the pictures. ? Using a special camera, you will have one set of pictures of your heart taken in the AM and a set of pictures in the PM.     PREPARATION FOR TEST:  ? Eat a light breakfast such as water or juice and toast.  ? If you are DIABETIC: Eat a normal breakfast with NO CAFFEINE and take your insulin as normal.   ? AVOID ALL FOODS & DRINKS containing CAFFEINE 12 HOURS PRIOR TO THE TEST: Including coffee, Tea, Daryl and other soft drinks even those labeled  caffeine free or decaffeinated.  ? HOLD THESE MEDICATIONS Persantine & Theophylline (Theodur)  24 hours prior & bring your inhaler with you.    The physician will specify if the following Beta blockers need to be held for 24 hours prior to test:

## 2021-03-05 ENCOUNTER — PROCEDURE VISIT (OUTPATIENT)
Dept: CARDIOLOGY CLINIC | Age: 66
End: 2021-03-05
Payer: MEDICARE

## 2021-03-05 DIAGNOSIS — R06.02 SHORTNESS OF BREATH: Primary | ICD-10-CM

## 2021-03-05 LAB
LV EF: 78 %
LVEF MODALITY: NORMAL

## 2021-03-05 PROCEDURE — 93306 TTE W/DOPPLER COMPLETE: CPT | Performed by: INTERNAL MEDICINE

## 2021-03-08 ENCOUNTER — COMMUNICATION - HEALTHEAST (OUTPATIENT)
Dept: FAMILY MEDICINE | Facility: CLINIC | Age: 66
End: 2021-03-08

## 2021-03-08 DIAGNOSIS — F33.0 MILD EPISODE OF RECURRENT MAJOR DEPRESSIVE DISORDER (H): ICD-10-CM

## 2021-03-10 ENCOUNTER — PROCEDURE VISIT (OUTPATIENT)
Dept: CARDIOLOGY CLINIC | Age: 66
End: 2021-03-10
Payer: MEDICARE

## 2021-03-10 DIAGNOSIS — R06.02 SHORTNESS OF BREATH: ICD-10-CM

## 2021-03-10 LAB
LV EF: 84 %
LVEF MODALITY: NORMAL

## 2021-03-10 PROCEDURE — 93017 CV STRESS TEST TRACING ONLY: CPT | Performed by: INTERNAL MEDICINE

## 2021-03-10 PROCEDURE — 93018 CV STRESS TEST I&R ONLY: CPT | Performed by: INTERNAL MEDICINE

## 2021-03-10 PROCEDURE — A9500 TC99M SESTAMIBI: HCPCS | Performed by: INTERNAL MEDICINE

## 2021-03-10 PROCEDURE — 78452 HT MUSCLE IMAGE SPECT MULT: CPT | Performed by: INTERNAL MEDICINE

## 2021-03-10 PROCEDURE — 93016 CV STRESS TEST SUPVJ ONLY: CPT | Performed by: INTERNAL MEDICINE

## 2021-03-15 ENCOUNTER — TELEPHONE (OUTPATIENT)
Dept: CARDIOLOGY CLINIC | Age: 66
End: 2021-03-15

## 2021-03-17 ENCOUNTER — PROCEDURE VISIT (OUTPATIENT)
Dept: CARDIOLOGY CLINIC | Age: 66
End: 2021-03-17
Payer: MEDICARE

## 2021-03-17 DIAGNOSIS — R42 DIZZINESS: ICD-10-CM

## 2021-03-17 PROCEDURE — 93880 EXTRACRANIAL BILAT STUDY: CPT | Performed by: INTERNAL MEDICINE

## 2021-03-19 ENCOUNTER — TELEPHONE (OUTPATIENT)
Dept: CARDIOLOGY CLINIC | Age: 66
End: 2021-03-19

## 2021-03-19 NOTE — TELEPHONE ENCOUNTER
Summary    Supervising physician Dr. Mike Gould .    Normal LV function. NORMAL EDV    There is normal isotope uptake following exercise and at rest. There is no    evidence of exercise induced ischemia.    This is a normal study. Summary   Subcostal views not obtainable d/t hyperechoic liver. Left ventricular systolic function is hyperdynamic with an ejection fraction   of 75-80%. Mild concentric left ventricular hypertrophy. Grade I diastolic dysfunction. Normal pulmonary artery pressure. RVSP = 23 mmHg. No evidence of pericardial effusion. evan             PT NOTIFIED

## 2021-03-26 ENCOUNTER — OFFICE VISIT (OUTPATIENT)
Dept: CARDIOLOGY CLINIC | Age: 66
End: 2021-03-26
Payer: MEDICARE

## 2021-03-26 VITALS
HEIGHT: 59 IN | SYSTOLIC BLOOD PRESSURE: 138 MMHG | DIASTOLIC BLOOD PRESSURE: 72 MMHG | WEIGHT: 172.4 LBS | BODY MASS INDEX: 34.76 KG/M2 | HEART RATE: 96 BPM

## 2021-03-26 DIAGNOSIS — R42 DIZZINESS: Primary | ICD-10-CM

## 2021-03-26 PROCEDURE — G8417 CALC BMI ABV UP PARAM F/U: HCPCS | Performed by: INTERNAL MEDICINE

## 2021-03-26 PROCEDURE — G8484 FLU IMMUNIZE NO ADMIN: HCPCS | Performed by: INTERNAL MEDICINE

## 2021-03-26 PROCEDURE — G8399 PT W/DXA RESULTS DOCUMENT: HCPCS | Performed by: INTERNAL MEDICINE

## 2021-03-26 PROCEDURE — 1090F PRES/ABSN URINE INCON ASSESS: CPT | Performed by: INTERNAL MEDICINE

## 2021-03-26 PROCEDURE — 4040F PNEUMOC VAC/ADMIN/RCVD: CPT | Performed by: INTERNAL MEDICINE

## 2021-03-26 PROCEDURE — 4004F PT TOBACCO SCREEN RCVD TLK: CPT | Performed by: INTERNAL MEDICINE

## 2021-03-26 PROCEDURE — 3017F COLORECTAL CA SCREEN DOC REV: CPT | Performed by: INTERNAL MEDICINE

## 2021-03-26 PROCEDURE — G8427 DOCREV CUR MEDS BY ELIG CLIN: HCPCS | Performed by: INTERNAL MEDICINE

## 2021-03-26 PROCEDURE — 99214 OFFICE O/P EST MOD 30 MIN: CPT | Performed by: INTERNAL MEDICINE

## 2021-03-26 PROCEDURE — 1123F ACP DISCUSS/DSCN MKR DOCD: CPT | Performed by: INTERNAL MEDICINE

## 2021-03-26 RX ORDER — MECLIZINE HCL 12.5 MG/1
12.5 TABLET ORAL 3 TIMES DAILY PRN
Qty: 15 TABLET | Refills: 0 | Status: SHIPPED | OUTPATIENT
Start: 2021-03-26 | End: 2021-04-05

## 2021-03-26 RX ORDER — AMLODIPINE BESYLATE AND BENAZEPRIL HYDROCHLORIDE 5; 10 MG/1; MG/1
1 CAPSULE ORAL DAILY
COMMUNITY

## 2021-03-26 NOTE — PROGRESS NOTES
Elizabeth Correa MD        OFFICE  FOLLOWUP NOTE    Chief complaints: patient is here for management of Dizziness, Headaches, HTN    F/u on EVENT monitor, stress test and echo and HTN    Subjective: patient feels better, no chest pain, no shortness of breath, + dizziness, + palpitations    YVETTE Carrasco is a 72 y. o.year old who  has a past medical history of Anxiety, Bipolar disorder ([address] Choking, Chronic back pain, Depression, Diabetes mellitus ([address] Difficult intubation, Fibromyalgia, History of echocardiogram, History of stress test, Hx of cardiovascular stress test, Hyperlipidemia, Hypertension, Panic attacks, Pneumonia, PTSD (post-traumatic stress disorder), and Shortness of breath. and presents for management of  Dizziness, Headaches, HTN which are well controlled    SHE HAD 14 event MONITOR, SHE HAS NOT mailed it back yet as she was very tired, blood pressure is better since lisinopril was increased, headache is resolved, had normal stress test and echo  Current Outpatient Medications   Medication Sig Dispense Refill    amLODIPine-benazepril (LOTREL) 5-10 MG per capsule 1 capsule daily      gabapentin (NEURONTIN) 400 MG capsule Take 400 mg by mouth every evening.  polyethylene glycol (GLYCOLAX) 17 GM/SCOOP powder Take 17 g by mouth daily      glimepiride (AMARYL) 2 MG tablet Take 2 mg by mouth every morning (before breakfast)      lisinopril (PRINIVIL;ZESTRIL) 40 MG tablet Take 1 tablet by mouth every morning 90 tablet 3    diazepam (VALIUM) 5 MG tablet Take 5 mg by mouth every 6 hours as needed for Anxiety      omeprazole (PRILOSEC) 10 MG delayed release capsule Take 10 mg by mouth daily      metFORMIN (GLUCOPHAGE) 1000 MG tablet Take 1,000 mg by mouth 2 times daily (with meals)      PARoxetine (PAXIL) 10 MG tablet Take 10 mg by mouth every morning.  oxyCODONE-acetaminophen (PERCOCET) 7.5-325 MG per tablet Take 1 tablet by mouth as needed  .        No current facility-administered medications for this visit. Allergies: Sulfa antibiotics  Past Medical History:   Diagnosis Date    Anxiety     Bipolar disorder ([address]     Choking     \"Been Choking On Food For 6 Months\"    Chronic back pain     Depression     Diabetes mellitus ([address] Dx 2014    Difficult intubation     Fibromyalgia     History of echocardiogram 03/05/2021    EF 75-80%    History of stress test 03/10/2021    NORMAL STUDY    Hx of cardiovascular stress test 03/10/2021    Normal study    Hyperlipidemia     Hypertension     Panic attacks     Pneumonia Dx 1990's    PTSD (post-traumatic stress disorder)     Shortness of breath      Past Surgical History:   Procedure Laterality Date    BACK SURGERY  2003    Lower Back, No Hardware    ENDOSCOPY, COLON, DIAGNOSTIC  Last Done 3-17    EYE SURGERY Bilateral 2000's    Cataracts With Lens Implants    HERNIA REPAIR  1970's    Abdominal Hernia Repair    HYSTERECTOMY VAGINAL  1980's    KNEE SURGERY Right 2014    Benign \"Tumor\" Removed Right Knee    LEG SURGERY Right 2000's    \"Mole Removed From Back Of Right Calf, Became Infected , They Had To Go Back In\"    OTHER SURGICAL HISTORY  1990's    \"Tubes And Ovaries Removed In 1990's\"    ROTATOR CUFF REPAIR Right 2014    UPPER GASTROINTESTINAL ENDOSCOPY  05/11/2017    With Dilation     History reviewed. No pertinent family history.   Social History     Tobacco Use    Smoking status: Current Every Day Smoker     Packs/day: 0.50     Years: 44.00     Pack years: 22.00     Types: Cigarettes     Start date: 1973    Smokeless tobacco: Never Used   Substance Use Topics    Alcohol use: No      [unfilled]  Review of Systems:   · Constitutional: No Fever or Weight Loss   · Eyes: No Decreased Vision  · ENT: No Headaches, Hearing Loss or Vertigo  · Cardiovascular: No chest pain, dyspnea on exertion, palpitations or loss of consciousness  · Respiratory: No cough or wheezing    · Gastrointestinal: No abdominal pain, appetite loss, blood in stools, constipation, diarrhea or heartburn  · Genitourinary: No dysuria, trouble voiding, or hematuria  · Musculoskeletal:  No gait disturbance, weakness or joint complaints  · Integumentary: No rash or pruritis  · Neurological: No TIA or stroke symptoms  · Psychiatric: No anxiety or depression  · Endocrine: No malaise, fatigue or temperature intolerance  · Hematologic/Lymphatic: No bleeding problems, blood clots or swollen lymph nodes  · Allergic/Immunologic: No nasal congestion or hives  All systems negative except as marked. Objective:  /72 (Site: Right Upper Arm, Position: Sitting, Cuff Size: Medium Adult)   Pulse 96   Ht 4' 11\" (1.499 m)   Wt 172 lb 6.4 oz (78.2 kg)   BMI 34.82 kg/m²   Wt Readings from Last 3 Encounters:   03/26/21 172 lb 6.4 oz (78.2 kg)   03/04/21 175 lb 12.8 oz (79.7 kg)   05/11/17 180 lb (81.6 kg)     Body mass index is 34.82 kg/m². GENERAL - Alert, oriented, pleasant, in no apparent distress,normal grooming  HEENT - pupils are intact, cornea intact, conjunctive normal color, ears are normal in exam,  Neck - Supple. No jugular venous distention noted. No carotid bruits, no apical -carotid delay  Respiratory:  Normal breath sounds, No respiratory distress, No wheezing, No chest tenderness. ,no use of accessory muscles, diaphragm movement is normal  Cardiovascular: (PMI) apex non displaced,no lifts no thrills, no s3,no s4, Normal heart rate, Normal rhythm, No murmurs, No rubs, No gallops.  Carotid arteries pulse and amplitude are normal no bruit, no abdominal bruit noted ( normal abdominal aorta ausculation),   Extremities - No cyanosis, clubbing, or significant edema, no varicose veins    Abdomen - No masses, tenderness, or organomegaly, no hepato-splenomegally, no bruits  Musculoskeletal - No significant edema, no kyphosis or scoliosis, no deformity in any extremity noted, muscle strength and tone are normal  Skin: no ulcer,no scar,no stasis dermatitis Neurologic - alert oriented times 3,Cranial nerves II through XII are grossly intact. There were no gross focal neurologic abnormalities. Psychiatric: normal mood and affect    No results found for: CKTOTAL, CKMB, CKMBINDEX, TROPONINI  BNP:  No results found for: BNP  PT/INR:  No results found for: PTINR  Lab Results   Component Value Date    LABA1C 8.7 (H) 02/25/2021    LABA1C 8.0 (H) 10/30/2020     Lab Results   Component Value Date    CHOL 255 (H) 02/25/2021    TRIG 250 (H) 02/25/2021    HDL 36 (L) 02/25/2021    LDLDIRECT 194 (H) 02/25/2021     Lab Results   Component Value Date    ALT 39 02/25/2021    AST 28 02/25/2021     TSH:  No results found for: TSH    Impression:  Latesha Mercer is a 72 y. o.year old who  has a past medical history of Anxiety, Bipolar disorder ([address] Choking, Chronic back pain, Depression, Diabetes mellitus ([address] Difficult intubation, Fibromyalgia, History of echocardiogram, History of stress test, Hx of cardiovascular stress test, Hyperlipidemia, Hypertension, Panic attacks, Pneumonia, PTSD (post-traumatic stress disorder), and Shortness of breath. and presents with     Plan:  HTN: sat goal, continue lisinopril to 40mg daily and continue HCTZ, recommend to check daily blood pressure at home, it needed, will add norvasc, Avoid red meat, processed meat, and salt,start exercise, lose weight, increase fresh fruits, green vegetable and nutsm check chem 7 and renal doppler to r/o renal artery stenosis is pending RECOMMEND TO STOP SMOKING  2. Palpitations: 14 days event monitor is pending  3. Dizziness: not better since htn is controlled, it could be vertigo, will get meclizine, will get carotid doppler and if dizziness does not improve, will need CT head to r/o TIA, add babay aspitin and statins  4. Shortness of breath with exertion: stress test and echo ordrered  5. DM: not controlled, continue metformin and amaryl  1.  Dyslipidemia: start statindiet  All labs, medications and tests reviewed, continue all other medications of all above medical condition listed as is.     [unfilled]

## 2021-03-26 NOTE — LETTER
Nick Zaragoza  1955  [id number]    Have you had any Chest Pain that is not new? - Yes  If Yes DO EKG - How does it feel - pressure   How long does the pain last - 3  minutes    How long have you been having the pain - Months   Did you take a NONE   And did it relieve the pain - it goes away on it's own     DO EKG IF: Patient has a Heart Rate above 100 or below 40     CAD (Coronary Artery Disease) patient should have one on file every 6 months     Have you had any Shortness of Breath - Yes  If Yes - When at rest and on exertion    Have you had any dizziness - Yes, ongoing, occasional, unchanged  If Yes DO ORTHOSTATIC BP - when do you feel dizzy regardless of rest or activity   How long does it last .3  seconds      Sitting wait 5 minutes do supine (laying down) wait 5 minutes then do standing - log each in \"vitals\" area in Epic   Be sure to ask what symptoms they are having if they get dizzy while completing ortho stats such as: room spinning, nausea, etc.    Have you had any palpitations that are not new? - Yes  If Yes DO EKG - Do you feel your heart fluttering  How long does it last - .3  seconds     Is the patient on any of the following medications - NONE  If Yes DO EKG - Needs done every 6 months    Do you have any edema - swelling in No    If Yes - CHECK TO SEE IF THE EDEMA IS PITTING    When did you have your last labs drawn 2/25/21, IN CHART  If we do not have these labs you are retrieve these labs for these providers! Do you have a surgery or procedure scheduled in the near future - Yes  If Yes- DO EKG  If Yes - Who is the surgery with?  JOHN GARCÍA [facility]  Phone number of physician     Fax number of physician     Type of surgery  BACK INJECTION      GIVE THIS INFORMATION TO CLAUDE YOST

## 2021-05-27 ASSESSMENT — PATIENT HEALTH QUESTIONNAIRE - PHQ9: SUM OF ALL RESPONSES TO PHQ QUESTIONS 1-9: 1

## 2021-05-28 ASSESSMENT — ANXIETY QUESTIONNAIRES: GAD7 TOTAL SCORE: 0

## 2021-05-30 ENCOUNTER — RECORDS - HEALTHEAST (OUTPATIENT)
Dept: ADMINISTRATIVE | Facility: CLINIC | Age: 66
End: 2021-05-30

## 2021-05-30 VITALS — HEIGHT: 64 IN | BODY MASS INDEX: 25.7 KG/M2 | WEIGHT: 150.5 LBS

## 2021-05-30 NOTE — PROGRESS NOTES
Assessment:     1. Annual physical exam     2. Mild episode of recurrent major depressive disorder (H)     3. Encounter for screening for diabetes mellitus  Glucose- FASTING FUTURE    Glycosylated Hemoglobin A1c   4. Encounter for screening for lipoid disorders  Lipid Cascade- FASTING FUTURE        Healthy female exam.    The past she had tried to wean off from Zoloft with relapse and increased severity of symptoms.  She does admit that she had started suddenly.  We discussed about weaning rather than stopping suddenly.  At this time she wants to continue with her Zoloft.     Plan:       All questions answered.  Diagnosis explained in detail, including differential.  Discussed healthy lifestyle modifications.  Educational material distributed.  Follow up: Return in about 1 year (around 7/19/2020) for Annual physical.     Subjective:     Chief Complaint   Patient presents with     Establish Care     Dr. Webster patient      Annual Exam     patient not fasting         Bella Ricks is a 63 y.o. female who presents for an annual exam. The patient is not sexually active. The patient participates in regular exercise: no. The patient reports that there is not domestic violence in her life.     Healthy Habits:   Regular Exercise: No  Sunscreen Use: Yes  Healthy Diet: Yes  Dental Visits Regularly: Yes  Seat Belt: Yes  Sexually active: No  Self Breast Exam Monthly:Yes and No  Hemoccults: No  Flex Sig: No  Colonoscopy: Yes  Lipid Profile: Yes  Glucose Screen: Yes  Prevention of Osteoporosis: Yes  Last Dexa: Yes  Guns at Home:  No      Immunization History   Administered Date(s) Administered     Hep A, historic 04/22/2009, 05/19/2010     Influenza, Seasonal, Inj PF IIV3 10/04/2015     Influenza, inj, historic,unspecified 10/05/2009, 09/20/2016     Influenza, seasonal,quad inj 6-35 mos 09/29/2010, 09/28/2011     Td, adult adsorbed, PF 07/19/2019     Tdap 04/22/2009     ZOSTER, LIVE 09/20/2016     Immunization status: up to  date and documented, due today.    No exam data present    Gynecologic History  Patient's last menstrual period was 2006.  Contraception: post menopausal status  Last Pap: 2017. Results were: normal  Last mammogram: 2018. Results were: normal      OB History    Para Term  AB Living   2 2 2         SAB TAB Ectopic Multiple Live Births                  # Outcome Date GA Lbr Jose Ramon/2nd Weight Sex Delivery Anes PTL Lv   2 Term            1 Term                Current Outpatient Medications   Medication Sig Dispense Refill     calcium carbonate-vitamin D3 (CALCIUM WITH VITAMIN D) 600 mg(1,500mg) -400 unit per tablet Take 1 tablet by mouth 2 (two) times a day.       cholecalciferol, vitamin D3, 2,000 unit Tab Take 2,000 Units by mouth every evening.       sertraline (ZOLOFT) 100 MG tablet Take 1 tablet (100 mg total) by mouth daily. 90 tablet 3     No current facility-administered medications for this visit.      Past Medical History:   Diagnosis Date     Breast cyst      Osteoporosis      Past Surgical History:   Procedure Laterality Date     OOPHORECTOMY Right      ORIF DISTAL RADIUS FRACTURE  2018     [place] REMOVAL OF OVARY/TUBE(S)      Description: Salpingo-oophorectomy;  Recorded: 2009;  Comments: ? R side     [place] REMOVAL OF TONSILS,<11 Y/O      Description: Tonsillectomy;  Recorded: 2009;     WRIST FRACTURE SURGERY Bilateral 2017    distal radius ORIF     Patient has no known allergies.  Family History   Problem Relation Age of Onset     Osteoporosis Mother      Aneurysm Father         cause of death, AAA     Aneurysm Paternal Uncle         Cause of Death, AAA     Aneurysm Paternal Grandfather      Dementia Brother         1/2 brother     Osteoporosis Maternal Grandmother      Social History     Socioeconomic History     Marital status:      Spouse name: Not on file     Number of children: Not on file     Years of education: Not on file     Highest education level: Not  on file   Occupational History     Not on file   Social Needs     Financial resource strain: Not on file     Food insecurity:     Worry: Not on file     Inability: Not on file     Transportation needs:     Medical: Not on file     Non-medical: Not on file   Tobacco Use     Smoking status: Former Smoker     Last attempt to quit: 2009     Years since quittin.6     Smokeless tobacco: Never Used   Substance and Sexual Activity     Alcohol use: No     Drug use: No     Sexual activity: Not on file   Lifestyle     Physical activity:     Days per week: Not on file     Minutes per session: Not on file     Stress: Not on file   Relationships     Social connections:     Talks on phone: Not on file     Gets together: Not on file     Attends Oriental orthodox service: Not on file     Active member of club or organization: Not on file     Attends meetings of clubs or organizations: Not on file     Relationship status: Not on file     Intimate partner violence:     Fear of current or ex partner: Not on file     Emotionally abused: Not on file     Physically abused: Not on file     Forced sexual activity: Not on file   Other Topics Concern     Not on file   Social History Narrative    Lives with brother .    Single. 2 adult children and 6 grand kids.    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)        Allyson Valdes MD  2019           Review of Systems  General:  Denies problem  Eyes: Denies problem  Ears/Nose/Throat: Denies problem  Cardiovascular: Denies problem  Respiratory:  Denies problem  Gastrointestinal:  Denies problem, Genitourinary: Denies problem  Musculoskeletal:  Denies problem  Skin: Denies problem  Neurologic: Denies problem  Psychiatric: Denies problem  Endocrine: Denies problem  Heme/Lymphatic: Denies problem   Allergic/Immunologic: Denies problem        Objective:         Vitals:    19 1554   BP: 131/71   Pulse: 72   Resp: 16   Temp: 98  F (36.7  C)   TempSrc: Oral  "  Weight: 152 lb (68.9 kg)   Height: 5' 3.78\" (1.62 m)     Body mass index is 26.27 kg/m .    Physical Exam:  General Appearance: Alert, cooperative, no distress, appears stated age  Head: Normocephalic, without obvious abnormality, atraumatic  Eyes: PERRL, conjunctiva/corneas clear, EOM's intact  Ears: Normal TM's and external ear canals, both ears  Nose: Nares normal, septum midline,mucosa normal, no drainage  Throat: Lips, mucosa, and tongue normal; teeth and gums normal  Neck: Supple, symmetrical, trachea midline, no adenopathy;  thyroid: not enlarged, symmetric, no tenderness/mass/nodules; no carotid bruit or JVD  Back: Symmetric, no curvature, ROM normal, no CVA tenderness  Lungs: Clear to auscultation bilaterally, respirations unlabored  Breasts: No breast masses, tenderness, asymmetry, or nipple discharge.  Heart: Regular rate and rhythm, S1 and S2 normal, no murmur, rub, or gallop, Abdomen: Soft, non-tender, bowel sounds active all four quadrants,  no masses, no organomegaly  Extremities: Extremities normal, atraumatic, no cyanosis or edema  Skin: Skin color, texture, turgor normal, no rashes or lesions  Lymph nodes: Cervical, supraclavicular, and axillary nodes normal  Neurologic: Normal        Little interest or pleasure in doing things: Not at all  Feeling down, depressed, or hopeless: Not at all  Trouble falling or staying asleep, or sleeping too much: Not at all  Feeling tired or having little energy: Several days  Poor appetite or overeating: Not at all  Feeling bad about yourself - or that you are a failure or have let yourself or your family down: Not at all  Trouble concentrating on things, such as reading the newspaper or watching television: Not at all  Moving or speaking so slowly that other people could have noticed. Or the opposite - being so fidgety or restless that you have been moving around a lot more than usual: Not at all  Thoughts that you would be better off dead, or of hurting " yourself in some way: Not at all  PHQ-9 Total Score: 1  If you checked off any problems, how difficult have these problems made it for you to do your work, take care of things at home, or get along with other people?: Not difficult at all

## 2021-05-31 VITALS — HEIGHT: 64 IN | BODY MASS INDEX: 24.24 KG/M2 | WEIGHT: 142 LBS

## 2021-06-01 VITALS — BODY MASS INDEX: 24.85 KG/M2 | HEIGHT: 64 IN | WEIGHT: 145.56 LBS

## 2021-06-03 VITALS — WEIGHT: 152 LBS | HEIGHT: 64 IN | BODY MASS INDEX: 25.95 KG/M2

## 2021-06-03 NOTE — TELEPHONE ENCOUNTER
Refill Approved    Rx renewed per Medication Renewal Policy. Medication was last renewed on 7.19.19.    Yesenia Hamilton, [facility] Connection Triage/Med Refill 11/13/2019     Requested Prescriptions   Pending Prescriptions Disp Refills     sertraline (ZOLOFT) 100 MG tablet [Pharmacy Med Name: SERTRALINE 100MG TAB] 90 tablet 0     Sig: TAKE 1 TABLET BY MOUTH ONCE DAILY       [facility] Refill Protocol  Passed - 11/13/2019  6:56 AM        Passed - PCP or prescribing provider visit in last year     Last office visit with prescriber/PCP: Visit date not found OR same dept: Visit date not found OR same specialty: Visit date not found  Last physical: Visit date not found Last MTM visit: Visit date not found   Next visit within 3 mo: Visit date not found  Next physical within 3 mo: Visit date not found  Prescriber OR PCP: Florence Meneses MD  Last diagnosis associated with med order: 1. Mild episode of recurrent major depressive disorder (H)  - sertraline (ZOLOFT) 100 MG tablet [Pharmacy Med Name: SERTRALINE 100MG TAB]; TAKE 1 TABLET BY MOUTH ONCE DAILY  Dispense: 90 tablet; Refill: 0    If protocol passes may refill for 12 months if within 3 months of last provider visit (or a total of 15 months).

## 2021-06-05 VITALS
HEART RATE: 71 BPM | SYSTOLIC BLOOD PRESSURE: 124 MMHG | BODY MASS INDEX: 26.37 KG/M2 | RESPIRATION RATE: 16 BRPM | OXYGEN SATURATION: 97 % | WEIGHT: 152.6 LBS | DIASTOLIC BLOOD PRESSURE: 86 MMHG

## 2021-06-09 NOTE — PROGRESS NOTES
Assessment/Plan:    1. Health care maintenance  Immunizations reviewed and utd  Colonosocopy reviewed 7/10, recheck 10 years.  Mammography reviewed 3/17  Pap reviewed 1/16 normal with negative HPV  Routine Dental and Eye care recommended  Discussed importance of regular exercise and appropriate calcium intake  DEXA reviewed from 4/16, recheck 2018    2. Major depressive disorder, recurrent episode  Stable with current meds and patient has no desire to make changes.  Okay refill Zoloft at current dose.    3. External hemorrhoid  - Ambulatory referral to Colorectal Surgery    4. Osteoporosis  Continue with efforts at calcium and vitamin D supplementation, exercise including strength training, and repeat DEXA in 2018.    Pt states an understanding and agrees with the above plan.    The following high BMI interventions were performed this visit: encouragement to exercise and weight monitoring            Health Maintenance   Topic Date Due     DEPRESSION FOLLOW UP HE  01/29/2017     MAMMOGRAM  03/07/2019     TD 18+ HE  04/22/2019     COLONOSCOPY  07/14/2020     PAP SMEAR  01/18/2021     ADVANCE DIRECTIVES DISCUSSED WITH PATIENT  01/18/2021     INFLUENZA VACCINE RULE BASED  Completed     TDAP ADULT ONE TIME DOSE  Completed     ZOSTER VACCINE  Completed         HPI    Patient is a 61 y.o. female presents for a physical exam.  Overall, patient doing well.  She does request refill of her sertraline.  She is on 100 mg and states this dose is going well for her.  When she is previously tried to cut back she has had recurrence of symptoms so is reluctant to make any changes.  Bella has diagnosis of osteoporosis but no history of fracture.  She has been diligently working on routine exercise including some strength training.  She continues with calcium and vitamin D supplementation.  Noted on her chart is a history of external hemorrhoid.  Patient does admit this is irritated periodically bleeds.  She would like to consider  "getting it removed.  She has not done a consultation for this in the past.  She reports normal bowel movements.  Last colonoscopy 7/10 reviewed and archive.    The following portions of the patient's history were reviewed and updated as appropriate: allergies, current medications, past family history, past medical history, past social history, past surgical history and problem list.    Review of Systems  Pertinent items are noted in HPI.  A 12 point comprehensive review of systems was negative except as noted.    Immunization History   Administered Date(s) Administered     Hep A, historic 04/22/2009, 05/19/2010     Influenza, Seasonal, Inj PF 10/04/2015     Influenza, inj, historic 10/05/2009, 09/20/2016     Influenza, seasonal,quad inj 6-35 mos 09/29/2010, 09/28/2011     Tdap 04/22/2009     ZOSTER 09/20/2016     No results found for this or any previous visit (from the past 240 hour(s)).    Patient Active Problem List   Diagnosis     Major Depression, Recurrent     Osteoporosis     Recurrent Dislocation Of The Shoulder Region     External hemorrhoid     Family History   Problem Relation Age of Onset     Osteoporosis Mother      Aneurysm Father      cause of death, AAA     Aneurysm Paternal Uncle      Cause of Death, AAA     Aneurysm Paternal Grandfather      Dementia Brother      1/2 brother     Osteoporosis Maternal Grandmother      Social History     Social History     Marital status:      Spouse name: N/A     Number of children: N/A     Years of education: N/A     Occupational History     Not on file.     Social History Main Topics     Smoking status: Former Smoker     Smokeless tobacco: Never Used     Alcohol use No     Drug use: Not on file     Sexual activity: Not on file     Other Topics Concern     Not on file     Social History Narrative       Objective:    /68  Pulse 68  Temp 98.8  F (37.1  C) (Oral)   Resp 16  Ht 5' 4.25\" (1.632 m)  Wt 150 lb 8 oz (68.3 kg)  LMP 09/09/2006  BMI 25.63 " kg/m2      General Appearance:    Alert, cooperative, no distress, appears stated age   Head:    Normocephalic, without obvious abnormality, atraumatic   Eyes:    PERRL, conjunctiva/corneas clear, EOM's intact both eyes; + glasses    Ears:    Normal TM's and external ear canals, both ears   Nose:   Nares normal, septum midline, mucosa normal   Throat:   Lips, mucosa, and tongue normal; teeth and gums normal   Neck:   Supple, symmetrical, trachea midline, no adenopathy;     thyroid:  no enlargement/tenderness/nodules; no carotid    bruit or JVD   Back:     Symmetric, no curvature, no CVA tenderness   Lungs:     Clear to auscultation bilaterally, respirations unlabored   Chest Wall:    No tenderness or deformity    Heart:    Regular rate and rhythm, S1 and S2 normal, no murmur, rub   or gallop   Breast Exam:    No tenderness, masses, or nipple abnormality   Abdomen:     Soft, non-tender, bowel sounds active all four quadrants,     no masses, no organomegaly   Genitalia:   normal external female genitalia; no significant atrophic changes.     Rectal:   external hemorrhoid with irritation and mild bleeding noted.     Extremities:   Extremities normal, atraumatic, no cyanosis or edema   Pulses:   2+ and symmetric all extremities   Skin:   Skin color, texture, turgor normal, no rashes or lesions       Neurologic:   CNII-XII intact

## 2021-06-10 NOTE — TELEPHONE ENCOUNTER
RN cannot approve Refill Request    RN can NOT refill this medication Protocol failed and NO refill given. Last office visit: Visit date not found Last Physical: Visit date not found Last MTM visit: Visit date not found Last visit same specialty: Visit date not found.  Next visit within 3 mo: Visit date not found  Next physical within 3 mo: Visit date not found      Sola Diaz, [facility] Triage/Med Refill 8/19/2020    Requested Prescriptions   Pending Prescriptions Disp Refills     sertraline (ZOLOFT) 100 MG tablet [Pharmacy Med Name: Sertraline HCl 100 MG Oral Tablet] 90 tablet 0     Sig: Take 1 tablet by mouth once daily       [facility] Refill Protocol  Failed - 8/18/2020 10:43 AM        Failed - PCP or prescribing provider visit in last year     Last office visit with prescriber/PCP: Visit date not found OR same dept: Visit date not found OR same specialty: Visit date not found  Last physical: Visit date not found Last MTM visit: Visit date not found   Next visit within 3 mo: Visit date not found  Next physical within 3 mo: Visit date not found  Prescriber OR PCP: Florence Meneses MD  Last diagnosis associated with med order: 1. Mild episode of recurrent major depressive disorder (H)  - sertraline (ZOLOFT) 100 MG tablet [Pharmacy Med Name: Sertraline HCl 100 MG Oral Tablet]; TAKE 1 TABLET BY MOUTH ONCE DAILY  Dispense: 90 tablet; Refill: 0    If protocol passes may refill for 12 months if within 3 months of last provider visit (or a total of 15 months).

## 2021-06-10 NOTE — TELEPHONE ENCOUNTER
Medication is being refilled.  Please inform patient that she visit.  Allyson Valdes MD  8/19/2020

## 2021-06-13 NOTE — PROGRESS NOTES
Assessment:     1. Mild episode of recurrent major depressive disorder (H)  sertraline (ZOLOFT) 100 MG tablet   2. Age-related osteoporosis without current pathological fracture  DXA Bone Density Scan   3. Special screening for malignant neoplasms, colon  Cologuard   4. Encounter for screening mammogram for breast cancer  Mammo Screening Bilateral   Okay to continue medication Zoloft.  Addressed osteoporosis and follow-up with DEXA scanning and perhaps need for other medication.  Health maintenance addressed as above.       Plan:      The diagnosis was discussed with the patient and evaluation and treatment plans outlined.  See orders for lab and imaging studies.  Further follow-up plans will be based on outcome of lab/imaging studies; see orders.   Patient Instructions     Check with insurance regarding Shingrix.    Meds refilled.    Pursue cologard, mammo and Dexa scan              Subjective:      Bella Ricks is a  female who presents for evaluation of   Chief Complaint   Patient presents with     Medication Management     Medication check     Patient would like continuing medication for her pressure in order.  Overall she feels she is coping well.  She hesitates to wean off medication during time of current Covid pandemic.  She retired in July 2020.  Her brother has been living with her for the last 3 years.  He was in a nursing home in [place] and moved and has been since then doing well.  Regarding osteoporosis, she has been in the drug-free..  Follow-up DEXA scan is supposed to be done patient is agreeable to pursue that.  She would like to avoid going for colonoscopy.  Agreeable for Cologuard.    The following portions of the patient's history were reviewed and updated as appropriate: allergies, current medications, past family history, past medical history, past social history, past surgical history and problem list.    Review of Systems  Constitutional: negative  Respiratory:  negative  Cardiovascular: negative       Objective:   /86 (Patient Site: Left Arm, Patient Position: Sitting, Cuff Size: Adult Regular)   Pulse 71   Resp 16   Wt 152 lb 9.6 oz (69.2 kg)   LMP 09/09/2006   SpO2 97%   BMI 26.37 kg/m      GENERAL: Healthy, alert and no distress  EYES: Eyes grossly normal to inspection. No discharge or erythema, or obvious scleral/conjunctival abnormalities.  RESP: No audible wheeze, cough, or visible cyanosis.  No visible retractions or increased work of breathing.    NEURO: Cranial nerves grossly intact. Mentation and speech appropriate for age.  PSYCH: Mentation appears normal, affect normal/bright, judgement and insight intact, normal speech and appearance well-groomed    Little interest or pleasure in doing things: Not at all  Feeling down, depressed, or hopeless: Not at all  Trouble falling or staying asleep, or sleeping too much: Not at all  Feeling tired or having little energy: Several days  Poor appetite or overeating: Not at all  Feeling bad about yourself - or that you are a failure or have let yourself or your family down: Not at all  Trouble concentrating on things, such as reading the newspaper or watching television: Not at all  Moving or speaking so slowly that other people could have noticed. Or the opposite - being so fidgety or restless that you have been moving around a lot more than usual: Not at all  Thoughts that you would be better off dead, or of hurting yourself in some way: Not at all  PHQ-9 Total Score: 1  If you checked off any problems, how difficult have these problems made it for you to do your work, take care of things at home, or get along with other people?: Not difficult at all

## 2021-06-13 NOTE — TELEPHONE ENCOUNTER
Meds refilled. Patient needs a med check appointment before further refills    Allyson Valdes MD  12/1/2020

## 2021-06-13 NOTE — TELEPHONE ENCOUNTER
Requested Prescriptions     Pending Prescriptions Disp Refills     sertraline (ZOLOFT) 100 MG tablet 90 tablet 0     Sig: Take 1 tablet (100 mg total) by mouth daily.     Last Office Visit:  7/19/2019  Last Refill:  8/20/2020  CSA:  N/A  Date of Last Labs:  8/1/2019

## 2021-06-13 NOTE — PATIENT INSTRUCTIONS - HE
Check with insurance regarding Shingrix.    Meds refilled.    Pursue cologard, mammo and Dexa scan

## 2021-06-13 NOTE — TELEPHONE ENCOUNTER
RN cannot approve Refill Request    RN can NOT refill this medication Protocol failed and NO refill given. Last office visit: Visit date not found Last Physical: 7/19/2019 Last MTM visit: Visit date not found Last visit same specialty: Visit date not found.  Next visit within 3 mo: Visit date not found  Next physical within 3 mo: Visit date not found      Sola Diaz, [facility] Triage/Med Refill 12/1/2020    Requested Prescriptions   Pending Prescriptions Disp Refills     sertraline (ZOLOFT) 100 MG tablet 90 tablet 0     Sig: Take 1 tablet (100 mg total) by mouth daily.       [facility] Refill Protocol  Failed - 11/30/2020  3:41 PM        Failed - PCP or prescribing provider visit in last year     Last office visit with prescriber/PCP: Visit date not found OR same dept: Visit date not found OR same specialty: Visit date not found  Last physical: 7/19/2019 Last MTM visit: Visit date not found   Next visit within 3 mo: Visit date not found  Next physical within 3 mo: Visit date not found  Prescriber OR PCP: Allyson Valdes MD  Last diagnosis associated with med order: 1. Mild episode of recurrent major depressive disorder (H)  - sertraline (ZOLOFT) 100 MG tablet; Take 1 tablet (100 mg total) by mouth daily.  Dispense: 90 tablet; Refill: 0    If protocol passes may refill for 12 months if within 3 months of last provider visit (or a total of 15 months).

## 2021-06-14 NOTE — ANESTHESIA PREPROCEDURE EVALUATION
Anesthesia Evaluation      Patient summary reviewed   No history of anesthetic complications     Airway   Mallampati: II  Neck ROM: full   Pulmonary - negative ROS and normal exam                          Cardiovascular - negative ROS and normal exam  Rhythm: regular  Rate: normal,         Neuro/Psych    (+) depression,     Endo/Other       Comments: Osteoporosis      GI/Hepatic/Renal - negative ROS           Dental - normal exam                 Chemistry        Component Value Date/Time     12/13/2017 0537    K 4.2 12/13/2017 0537     12/13/2017 0537    CO2 27 12/13/2017 0537    BUN 14 12/13/2017 0537    CREATININE 0.80 12/13/2017 0537     (H) 12/13/2017 0537        Component Value Date/Time    CALCIUM 8.6 12/13/2017 0537    ALKPHOS 58 12/13/2017 0537    AST 15 12/13/2017 0537    ALT 15 12/13/2017 0537    BILITOT 0.3 12/13/2017 0537        Lab Results   Component Value Date    WBC 8.8 12/13/2017    HGB 13.1 12/13/2017    HCT 40.2 12/13/2017    MCV 90 12/13/2017     12/13/2017                Anesthesia Plan  Planned anesthetic: general endotracheal    ASA 2   Induction: intravenous   Anesthetic plan and risks discussed with: patient    Post-op plan: routine recovery

## 2021-06-14 NOTE — ANESTHESIA CARE TRANSFER NOTE
Last vitals:   Vitals:    12/13/17 1401   BP: 128/60   Pulse: 77   Resp: 24   Temp: 37.9  C (100.3  F)   SpO2: 100%     Patient's level of consciousness is drowsy  Spontaneous respirations: yes  Maintains airway independently: yes  Dentition unchanged: yes  Oropharynx: oropharynx clear of all foreign objects    QCDR Measures:  ASA# 20 - Surgical Safety Checklist: WHO surgical safety checklist completed prior to induction  PQRS# 430 - Adult PONV Prevention: 4558F - Pt received => 2 anti-emetic agents (different classes) preop & intraop  ASA# 8 - Peds PONV Prevention: NA - Not pediatric patient, not GA or 2 or more risk factors NOT present  PQRS# 424 - Dotty-op Temp Management: 4559F - At least one body temp DOCUMENTED => 35.5C or 95.9F within required timeframe  PQRS# 426 - PACU Transfer Protocol: - Transfer of care checklist used  ASA# 14 - Acute Post-op Pain: ASA14B - Patient did NOT experience pain >= 7 out of 10

## 2021-06-14 NOTE — ANESTHESIA POSTPROCEDURE EVALUATION
Patient: Bella Ricks  OPEN REDUCTION INTERNAL FIXATION BILATERAL WRIST FRACTURES  Anesthesia type: general    Patient location: PACU  Last vitals:   Vitals:    12/13/17 1550   BP: 148/69   Pulse: 70   Resp:    Temp: 36.9  C (98.4  F)   SpO2: 95%     Post vital signs: stable  Level of consciousness: awake and responds to simple questions  Post-anesthesia pain: pain controlled  Post-anesthesia nausea and vomiting: no  Pulmonary: unassisted, return to baseline  Cardiovascular: stable and blood pressure at baseline  Hydration: adequate  Anesthetic events: no    QCDR Measures:  ASA# 11 - Dotty-op Cardiac Arrest: ASA11B - Patient did NOT experience unanticipated cardiac arrest  ASA# 12 - Dotty-op Mortality Rate: ASA12B - Patient did NOT die  ASA# 13 - PACU Re-Intubation Rate: ASA13B - Patient did NOT require a new airway mgmt  ASA# 10 - Composite Anes Safety: ASA10A - No serious adverse event    Additional Notes:

## 2021-06-15 NOTE — TELEPHONE ENCOUNTER
RN cannot approve Refill Request    RN can NOT refill this medication Med was sent on 12/15/2020 for 1 year supply to the same pharmacy. Last office visit: 12/15/2020 Allyson Valdes MD Last Physical: 7/19/2019 Last MTM visit: Visit date not found Last visit same specialty: 12/15/2020 Allyson Valdes MD.  Next visit within 3 mo: Visit date not found  Next physical within 3 mo: Visit date not found      Aracelis Duran, [facility] Triage/Med Refill 3/9/2021    Requested Prescriptions   Pending Prescriptions Disp Refills     sertraline (ZOLOFT) 100 MG tablet 90 tablet 3     Sig: Take 1 tablet (100 mg total) by mouth daily.       [facility] Refill Protocol  Passed - 3/8/2021  6:06 PM        Passed - PCP or prescribing provider visit in last year     Last office visit with prescriber/PCP: 12/15/2020 Allyson Valdes MD OR same dept: 12/15/2020 Allyson Valdes MD OR same specialty: 12/15/2020 Allyson Valdes MD  Last physical: 7/19/2019 Last MTM visit: Visit date not found   Next visit within 3 mo: Visit date not found  Next physical within 3 mo: Visit date not found  Prescriber OR PCP: Allyson Valdes MD  Last diagnosis associated with med order: 1. Mild episode of recurrent major depressive disorder (H)  - sertraline (ZOLOFT) 100 MG tablet; Take 1 tablet (100 mg total) by mouth daily.  Dispense: 90 tablet; Refill: 3    If protocol passes may refill for 12 months if within 3 months of last provider visit (or a total of 15 months).

## 2021-06-17 NOTE — PATIENT INSTRUCTIONS - HE
Patient Instructions by Allyson Valdes MD at 7/19/2019  4:10 PM     Author: Allyson Valdes MD Service: -- Author Type: Physician    Filed: 7/19/2019  4:40 PM Encounter Date: 7/19/2019 Status: Signed    : Allyson Valdes MD (Physician)           Preventing Skin Cancer     Use sunscreen of SPF 30 or greater. Apply liberally.   Relaxing in the sun may feel good, but it isnt good for your skin. In fact, being exposed to the suns harmful rays is a major cause of skin cancer. This is a serious disease that can be life-threatening. People of all ages and backgrounds are at risk. But in most cases, skin cancer can be prevented.  Your role in prevention  You can act today to help prevent skin cancer. Start by avoiding the suns UV (ultraviolet) rays. And dont use tanning beds. These are no safer than the sun. Taking these steps can help keep you from getting skin cancer. It can also help prevent wrinkles and other aging effects caused by the sun. Make sure your children also follow these safeguards. Now is the time to start taking preventive steps against skin cancer.  When you are outdoors  Protect your skin when you go outdoors during the day. Take precautions whenever you go out to eat, run errands by car or on foot, or do any outdoor activity. There isnt just one easy way to protect your skin. Its best to follow all of these steps:    Wear tightly woven clothing that covers your skin. Put on a wide-brimmed hat to protect your face, ears, and scalp.    Watch the clock. Try to avoid the sun between 10 a.m. and 4 p.m., when it is strongest.    Head for the shade or create your own. Use an umbrella when sitting or strolling.    Know that the suns rays can reflect off sand, water, and snow. This can harm your skin. Take extra care when you are near reflective surfaces.    Keep in mind that even when the weather is hazy or cloudy, your skin can be exposed to strong UV rays.    Shield your skin with sunscreen.  Also apply sunscreen to your childrens skin.  Tips for using sunscreen  To help prevent skin cancer, choose the right sunscreen and use it correctly. Try the following tips:    Choose a sunscreen that has a sun protection factor (SPF) of at least 30. Also choose a sunscreen labeled broad spectrum. This will shield you from both UVA and UVB (ultraviolet A and B) rays.    If one brand irritates your skin, try another, particularly ones without fragrance.    Use a water-resistant sunscreen if you swim or sweat.    Use at least an ounce of sunscreen to cover exposed areas. This is enough to fill a shot glass. You might need to adjust the amount depending on your body size.    Apply the sunscreen to dry skin about 15 minutes before going outdoors. This gives it time to be absorbed.    Reapply sunscreen every 2 hours. If youre active, do this more often.    Cover any sun-exposed skin, from your face to your feet. Dont forget your ears and your lips.    Know that while sunscreen helps protect you, it isnt enough. Sunscreens extend the length of time you can be outdoors before your skin begins to redden, but they don't give you total protection. Using sunscreen doesn't mean you can stay out in the sun indefinitely. Damage to the skin cells is still occurring. You should also wear protective clothing. And try to stay out of the sun as much as you can, especially from 10 a.m. to 4 p.m.  Date Last Reviewed: 1/1/2017 2000-2017 [employer]. [address] [place] [address], [place] [address]. All rights reserved. This information is not intended as a substitute for professional medical care. Always follow your healthcare professional's instructions.

## 2021-06-19 NOTE — PROGRESS NOTES
Assessment/Plan:    1. Health care maintenance  Immunizations reviewed and recommend patient check with insurance regarding coverage for new shingles vaccine which is recommended; annual flu shot also recommended.  Colonosocopy reviewed normal 2010, repeat 2020  Mammography reviewed normal 3/18, repeat every 1-2 years  Pap reviewed normal with negative HPV 2016, repeat 2021  Routine Dental and Eye care recommended  Discussed importance of regular exercise and appropriate calcium intake;     - Lipid Cascade  - Glucose    2. Mild episode of recurrent major depressive disorder (H)  Overall stable depression.  Patient very reluctant to make any medication changes so refill sertraline 100 mg daily and follow-up yearly.  Patient will contact us sooner with any problems or concerns.    3. Osteoporosis  History of fall with radial fractures bilaterally.  DEXA reviewed from 3/18, recommendation was to repeat 3/20 given bone density stable.  Recommend calcium intake (12-1500mg/day) with vitamin D (800-1000 IU daily) as well as weight bearing exercise to include strength and balance.  Calcium supplement should be calcium CITRATE        Pt states an understanding and agrees with the above plan.                Health Maintenance   Topic Date Due     DEPRESSION FOLLOW UP  1955     TD 18+ HE  04/22/2019     INFLUENZA VACCINE RULE BASED (1) 08/01/2018     MAMMOGRAM  03/29/2020     COLONOSCOPY  07/14/2020     PAP SMEAR  01/18/2021     ADVANCE DIRECTIVES DISCUSSED WITH PATIENT  01/18/2021     TDAP ADULT ONE TIME DOSE  Completed     ZOSTER VACCINE  Completed         HPI    Patient is a 62 y.o. female presents for a physical exam.  She is fasting today for labs and would like refill of her sertraline but has no other specific concerns.  Bella reports feeling well with no chest pain, shortness of breath, lower extremity edema, weight changes.    She continues working full-time and caring for her half brother with dementia.  She is  not dating/not sexually active.  Bella has no complaints of postmenopausal bleeding, vaginal discharge, changes in bowel or bladder function.  She has been taking sertraline from several years.  She is currently at 100 mg and feels stable here does not want to make any dose changes.  She had history of fall at work last year on the ice and had radial fracture bilaterally.  These were surgically treated.  She feels back to baseline now.  No other history of fall.  She denies any regular exercise at this point.  She is faithfully taking her calcium and vitamin D.  DEXA was done 3/18 showing osteopenia with high risk for fracture.      The following portions of the patient's history were reviewed and updated as appropriate: allergies, current medications, past family history, past medical history, past social history, past surgical history and problem list.    Review of Systems  Pertinent items are noted in HPI.  A 12 point comprehensive review of systems was negative except as noted.    Immunization History   Administered Date(s) Administered     Hep A, historic 04/22/2009, 05/19/2010     Influenza, Seasonal, Inj PF IIV3 10/04/2015     Influenza, inj, historic,unspecified 10/05/2009, 09/20/2016     Influenza, seasonal,quad inj 6-35 mos 09/29/2010, 09/28/2011     Tdap 04/22/2009     ZOSTER, LIVE 09/20/2016     No results found for this or any previous visit (from the past 240 hour(s)).    Patient Active Problem List   Diagnosis     Major Depression, Recurrent     Osteoporosis     Recurrent Dislocation Of The Shoulder Region     External hemorrhoid     Family History   Problem Relation Age of Onset     Osteoporosis Mother      Aneurysm Father      cause of death, AAA     Aneurysm Paternal Uncle      Cause of Death, AAA     Aneurysm Paternal Grandfather      Dementia Brother      1/2 brother     Osteoporosis Maternal Grandmother      Social History     Social History     Marital status:      Spouse name: N/A      "Number of children: N/A     Years of education: N/A     Occupational History     Not on file.     Social History Main Topics     Smoking status: Former Smoker     Quit date: 12/12/2009     Smokeless tobacco: Never Used     Alcohol use No     Drug use: No     Sexual activity: Not on file     Other Topics Concern     Not on file     Social History Narrative       Objective:    /64 (Patient Site: Left Arm, Patient Position: Sitting, Cuff Size: Adult Regular)  Pulse 64  Temp 98.2  F (36.8  C) (Oral)   Resp 16  Ht 5' 4\" (1.626 m)  Wt 145 lb 9 oz (66 kg)  LMP 09/09/2006  BMI 24.99 kg/m2    PHQ9 and GAD7 = 0    Labs reviewed from 12/17--glucose noted to be high and we will repeat fasting glucose today with cholesterol.  Rest of BMP, thyroid, CBC normal.    General Appearance:    Alert, cooperative, no distress, appears stated age   Head:    Normocephalic, without obvious abnormality, atraumatic   Eyes:    PERRL, conjunctiva/corneas clear, EOM's intact both eyes; + glasses   Ears:    Normal TM's and external ear canals, both ears   Nose:   Nares normal, septum midline, mucosa normal   Throat:   Lips, mucosa, and tongue normal; teeth and gums normal   Neck:   Supple, symmetrical, trachea midline, no adenopathy;     thyroid:  no enlargement/tenderness/nodules; no carotid    bruit or JVD   Back:     Symmetric, no curvature, no CVA tenderness   Lungs:     Clear to auscultation bilaterally, respirations unlabored   Chest Wall:    No tenderness or deformity    Heart:    Regular rate and rhythm, S1 and S2 normal, no murmur, rub   or gallop   Breast Exam:    No tenderness, masses, or nipple abnormality   Abdomen:     Soft, non-tender, bowel sounds active all four quadrants,     no masses, no organomegaly   Genitalia:   Normal external female genitalia with mild atrophic changes.  No irritation, lesions, or discharge at the introitus.   Rectal:   Normal external rectal tissue.   Extremities:   Extremities normal, " atraumatic, no cyanosis or edema   Pulses:   2+ and symmetric all extremities   Skin:   Skin color, texture, turgor normal, no rashes or lesions       Neurologic:   CNII-XII intact

## 2021-06-26 ENCOUNTER — HEALTH MAINTENANCE LETTER (OUTPATIENT)
Age: 66
End: 2021-06-26

## 2021-07-13 ENCOUNTER — RECORDS - HEALTHEAST (OUTPATIENT)
Dept: ADMINISTRATIVE | Facility: CLINIC | Age: 66
End: 2021-07-13

## 2021-07-21 ENCOUNTER — RECORDS - HEALTHEAST (OUTPATIENT)
Dept: ADMINISTRATIVE | Facility: CLINIC | Age: 66
End: 2021-07-21

## 2021-07-22 ENCOUNTER — RECORDS - HEALTHEAST (OUTPATIENT)
Dept: FAMILY MEDICINE | Facility: CLINIC | Age: 66
End: 2021-07-22

## 2021-07-22 DIAGNOSIS — Z12.31 OTHER SCREENING MAMMOGRAM: ICD-10-CM

## 2021-10-16 ENCOUNTER — HEALTH MAINTENANCE LETTER (OUTPATIENT)
Age: 66
End: 2021-10-16

## 2022-01-14 ENCOUNTER — HOSPITAL ENCOUNTER (OUTPATIENT)
Age: 67
Discharge: HOME | End: 2022-01-14
Payer: MEDICARE

## 2022-01-14 ENCOUNTER — HOSPITAL ENCOUNTER (OUTPATIENT)
Dept: MRI IMAGING | Age: 67
Discharge: HOME | End: 2022-01-14
Payer: MEDICARE

## 2022-01-14 DIAGNOSIS — M96.1 POSTLAMINECTOMY SYNDROME, NOT ELSEWHERE CLASSIFIED: ICD-10-CM

## 2022-01-14 LAB
ALBUMIN SERPL-MCNC: 4.8 GM/DL (ref 3.4–5)
ALP BLD-CCNC: 92 IU/L (ref 40–128)
ALT SERPL-CCNC: 34 U/L (ref 10–40)
ANION GAP SERPL CALCULATED.3IONS-SCNC: 14 MMOL/L (ref 4–16)
AST SERPL-CCNC: 33 IU/L (ref 15–37)
BASOPHILS ABSOLUTE: 0.1 K/CU MM
BASOPHILS RELATIVE PERCENT: 0.6 % (ref 0–1)
BILIRUB SERPL-MCNC: 0.2 MG/DL (ref 0–1)
BUN BLDV-MCNC: 9 MG/DL (ref 6–23)
CALCIUM SERPL-MCNC: 10.1 MG/DL (ref 8.3–10.6)
CHLORIDE BLD-SCNC: 96 MMOL/L (ref 99–110)
CHOLESTEROL: 250 MG/DL
CO2: 29 MMOL/L (ref 21–32)
CREAT SERPL-MCNC: 0.6 MG/DL (ref 0.6–1.1)
CREATININE URINE: 80.4 MG/DL (ref 28–217)
DIFFERENTIAL TYPE: ABNORMAL
EOSINOPHILS ABSOLUTE: 0.1 K/CU MM
EOSINOPHILS RELATIVE PERCENT: 1.5 % (ref 0–3)
ESTIMATED AVERAGE GLUCOSE: 255 MG/DL
GFR AFRICAN AMERICAN: >60 ML/MIN/1.73M2
GFR AFRICAN AMERICAN: >60 ML/MIN/1.73M2
GFR NON-AFRICAN AMERICAN: >60 ML/MIN/1.73M2
GFR NON-AFRICAN AMERICAN: >60 ML/MIN/1.73M2
GLUCOSE BLD-MCNC: 293 MG/DL (ref 70–99)
HBA1C MFR BLD: 10.5 % (ref 4.2–6.3)
HCT VFR BLD CALC: 49.3 % (ref 37–47)
HDLC SERPL-MCNC: 43 MG/DL
HEMOGLOBIN: 15 GM/DL (ref 12.5–16)
IMMATURE NEUTROPHIL %: 0.2 % (ref 0–0.43)
LDL CHOLESTEROL DIRECT: 171 MG/DL
LYMPHOCYTES ABSOLUTE: 3 K/CU MM
LYMPHOCYTES RELATIVE PERCENT: 35.3 % (ref 24–44)
MCH RBC QN AUTO: 27.5 PG (ref 27–31)
MCHC RBC AUTO-ENTMCNC: 30.4 % (ref 32–36)
MCV RBC AUTO: 90.5 FL (ref 78–100)
MICROALBUMIN/CREAT 24H UR: 2.1 MG/DL
MICROALBUMIN/CREAT UR-RTO: 26.1 MG/G CREAT (ref 0–30)
MONOCYTES ABSOLUTE: 0.6 K/CU MM
MONOCYTES RELATIVE PERCENT: 6.5 % (ref 0–4)
NUCLEATED RBC %: 0 %
PDW BLD-RTO: 13.3 % (ref 11.7–14.9)
PLATELET # BLD: 263 K/CU MM (ref 140–440)
PMV BLD AUTO: 11.1 FL (ref 7.5–11.1)
POC CREATININE: 0.5 MG/DL (ref 0.6–1.1)
POTASSIUM SERPL-SCNC: 6.1 MMOL/L (ref 3.5–5.1)
RBC # BLD: 5.45 M/CU MM (ref 4.2–5.4)
SEGMENTED NEUTROPHILS ABSOLUTE COUNT: 4.8 K/CU MM
SEGMENTED NEUTROPHILS RELATIVE PERCENT: 55.9 % (ref 36–66)
SODIUM BLD-SCNC: 139 MMOL/L (ref 135–145)
TOTAL IMMATURE NEUTOROPHIL: 0.02 K/CU MM
TOTAL NUCLEATED RBC: 0 K/CU MM
TOTAL PROTEIN: 7 GM/DL (ref 6.4–8.2)
TRIGL SERPL-MCNC: 259 MG/DL
WBC # BLD: 8.6 K/CU MM (ref 4–10.5)

## 2022-01-14 PROCEDURE — 80053 COMPREHEN METABOLIC PANEL: CPT

## 2022-01-14 PROCEDURE — 72158 MRI LUMBAR SPINE W/O & W/DYE: CPT

## 2022-01-14 PROCEDURE — 83036 HEMOGLOBIN GLYCOSYLATED A1C: CPT

## 2022-01-14 PROCEDURE — 82570 ASSAY OF URINE CREATININE: CPT

## 2022-01-14 PROCEDURE — 85025 COMPLETE CBC W/AUTO DIFF WBC: CPT

## 2022-01-14 PROCEDURE — 36415 COLL VENOUS BLD VENIPUNCTURE: CPT

## 2022-01-14 PROCEDURE — 82043 UR ALBUMIN QUANTITATIVE: CPT

## 2022-01-14 PROCEDURE — 80061 LIPID PANEL: CPT

## 2022-01-14 PROCEDURE — A9579 GAD-BASE MR CONTRAST NOS,1ML: HCPCS | Performed by: INTERNAL MEDICINE

## 2022-01-14 PROCEDURE — 6360000004 HC RX CONTRAST MEDICATION: Performed by: INTERNAL MEDICINE

## 2022-01-14 PROCEDURE — 83721 ASSAY OF BLOOD LIPOPROTEIN: CPT

## 2022-01-14 RX ADMIN — GADOTERIDOL 18 ML: 279.3 INJECTION, SOLUTION INTRAVENOUS at 13:15

## 2022-03-21 ENCOUNTER — APPOINTMENT (OUTPATIENT)
Dept: CT IMAGING | Age: 67
DRG: 379 | End: 2022-03-21
Payer: MEDICARE

## 2022-03-21 ENCOUNTER — HOSPITAL ENCOUNTER (INPATIENT)
Age: 67
LOS: 2 days | Discharge: HOME | DRG: 379 | End: 2022-03-23
Attending: STUDENT IN AN ORGANIZED HEALTH CARE EDUCATION/TRAINING PROGRAM | Admitting: INTERNAL MEDICINE
Payer: MEDICARE

## 2022-03-21 DIAGNOSIS — R73.9 HYPERGLYCEMIA: ICD-10-CM

## 2022-03-21 DIAGNOSIS — R10.84 GENERALIZED ABDOMINAL PAIN: Primary | ICD-10-CM

## 2022-03-21 DIAGNOSIS — K92.1 MELENA: ICD-10-CM

## 2022-03-21 PROBLEM — R10.32 LEFT LOWER QUADRANT ABDOMINAL PAIN: Status: ACTIVE | Noted: 2022-03-21

## 2022-03-21 LAB
ABO/RH: NORMAL
ALBUMIN SERPL-MCNC: 4.4 GM/DL (ref 3.4–5)
ALP BLD-CCNC: 116 IU/L (ref 40–129)
ALT SERPL-CCNC: 41 U/L (ref 10–40)
ANION GAP SERPL CALCULATED.3IONS-SCNC: 15 MMOL/L (ref 4–16)
ANTIBODY DATA: NORMAL
ANTIBODY SCREEN: NEGATIVE
ANTIGEN DATA: NORMAL
APTT: 18 SECONDS (ref 25.1–37.1)
AST SERPL-CCNC: 48 IU/L (ref 15–37)
BACTERIA: ABNORMAL /HPF
BASOPHILS ABSOLUTE: 0.1 K/CU MM
BASOPHILS RELATIVE PERCENT: 0.6 % (ref 0–1)
BILIRUB SERPL-MCNC: 0.2 MG/DL (ref 0–1)
BILIRUBIN URINE: NEGATIVE MG/DL
BLOOD, URINE: NEGATIVE
BUN BLDV-MCNC: 6 MG/DL (ref 6–23)
CALCIUM SERPL-MCNC: 9.3 MG/DL (ref 8.3–10.6)
CHLORIDE BLD-SCNC: 93 MMOL/L (ref 99–110)
CHP ED QC CHECK: NORMAL
CLARITY: CLEAR
CO2: 25 MMOL/L (ref 21–32)
COLOR: YELLOW
COMMENT: NORMAL
CREAT SERPL-MCNC: 0.6 MG/DL (ref 0.6–1.1)
DIFFERENTIAL TYPE: ABNORMAL
EKG ATRIAL RATE: 95 BPM
EKG DIAGNOSIS: NORMAL
EKG P AXIS: 20 DEGREES
EKG P-R INTERVAL: 146 MS
EKG Q-T INTERVAL: 380 MS
EKG QRS DURATION: 72 MS
EKG QTC CALCULATION (BAZETT): 477 MS
EKG R AXIS: -38 DEGREES
EKG T AXIS: 48 DEGREES
EKG VENTRICULAR RATE: 95 BPM
EOSINOPHILS ABSOLUTE: 0.2 K/CU MM
EOSINOPHILS RELATIVE PERCENT: 1.5 % (ref 0–3)
GFR AFRICAN AMERICAN: >60 ML/MIN/1.73M2
GFR NON-AFRICAN AMERICAN: >60 ML/MIN/1.73M2
GLUCOSE BLD-MCNC: 244 MG/DL
GLUCOSE BLD-MCNC: 244 MG/DL (ref 70–99)
GLUCOSE BLD-MCNC: 380 MG/DL (ref 70–99)
GLUCOSE, URINE: >1000 MG/DL
HCT VFR BLD CALC: 47.5 % (ref 37–47)
HEMOGLOBIN: 14.7 GM/DL (ref 12.5–16)
IMMATURE NEUTROPHIL %: 0.4 % (ref 0–0.43)
INR BLD: 0.99 INDEX
KETONES, URINE: NEGATIVE MG/DL
LACTATE: 1.9 MMOL/L (ref 0.4–2)
LEUKOCYTE ESTERASE, URINE: NEGATIVE
LIPASE: 47 IU/L (ref 13–60)
LYMPHOCYTES ABSOLUTE: 2.7 K/CU MM
LYMPHOCYTES RELATIVE PERCENT: 24.3 % (ref 24–44)
MCH RBC QN AUTO: 26.7 PG (ref 27–31)
MCHC RBC AUTO-ENTMCNC: 30.9 % (ref 32–36)
MCV RBC AUTO: 86.2 FL (ref 78–100)
MONOCYTES ABSOLUTE: 0.7 K/CU MM
MONOCYTES RELATIVE PERCENT: 6.4 % (ref 0–4)
NITRITE URINE, QUANTITATIVE: NEGATIVE
NUCLEATED RBC %: 0 %
PDW BLD-RTO: 13.3 % (ref 11.7–14.9)
PH, URINE: 5 (ref 5–8)
PLATELET # BLD: 269 K/CU MM (ref 140–440)
PMV BLD AUTO: 10.4 FL (ref 7.5–11.1)
POTASSIUM SERPL-SCNC: 4.2 MMOL/L (ref 3.5–5.1)
PROTEIN UA: NEGATIVE MG/DL
PROTHROMBIN TIME: 12.8 SECONDS (ref 11.7–14.5)
RBC # BLD: 5.51 M/CU MM (ref 4.2–5.4)
RBC URINE: ABNORMAL /HPF (ref 0–6)
SEGMENTED NEUTROPHILS ABSOLUTE COUNT: 7.3 K/CU MM
SEGMENTED NEUTROPHILS RELATIVE PERCENT: 66.8 % (ref 36–66)
SODIUM BLD-SCNC: 133 MMOL/L (ref 135–145)
SPECIFIC GRAVITY UA: >1.03 (ref 1–1.03)
SQUAMOUS EPITHELIAL: 4 /HPF
TOTAL IMMATURE NEUTOROPHIL: 0.04 K/CU MM
TOTAL NUCLEATED RBC: 0 K/CU MM
TOTAL PROTEIN: 7.5 GM/DL (ref 6.4–8.2)
TRANSITIONAL EPITHELIAL: <1 /HPF
TROPONIN T: <0.01 NG/ML
UROBILINOGEN, URINE: NORMAL MG/DL (ref 0.2–1)
WBC # BLD: 10.9 K/CU MM (ref 4–10.5)
WBC UA: 1 /HPF (ref 0–5)

## 2022-03-21 PROCEDURE — 82962 GLUCOSE BLOOD TEST: CPT

## 2022-03-21 PROCEDURE — 83690 ASSAY OF LIPASE: CPT

## 2022-03-21 PROCEDURE — 93005 ELECTROCARDIOGRAM TRACING: CPT | Performed by: PHYSICIAN ASSISTANT

## 2022-03-21 PROCEDURE — C9113 INJ PANTOPRAZOLE SODIUM, VIA: HCPCS | Performed by: STUDENT IN AN ORGANIZED HEALTH CARE EDUCATION/TRAINING PROGRAM

## 2022-03-21 PROCEDURE — 1200000000 HC SEMI PRIVATE

## 2022-03-21 PROCEDURE — 85025 COMPLETE CBC W/AUTO DIFF WBC: CPT

## 2022-03-21 PROCEDURE — 74177 CT ABD & PELVIS W/CONTRAST: CPT

## 2022-03-21 PROCEDURE — 86870 RBC ANTIBODY IDENTIFICATION: CPT

## 2022-03-21 PROCEDURE — 6360000002 HC RX W HCPCS: Performed by: STUDENT IN AN ORGANIZED HEALTH CARE EDUCATION/TRAINING PROGRAM

## 2022-03-21 PROCEDURE — 6360000004 HC RX CONTRAST MEDICATION: Performed by: PHYSICIAN ASSISTANT

## 2022-03-21 PROCEDURE — 86850 RBC ANTIBODY SCREEN: CPT

## 2022-03-21 PROCEDURE — 93010 ELECTROCARDIOGRAM REPORT: CPT | Performed by: INTERNAL MEDICINE

## 2022-03-21 PROCEDURE — 80053 COMPREHEN METABOLIC PANEL: CPT

## 2022-03-21 PROCEDURE — 86900 BLOOD TYPING SEROLOGIC ABO: CPT

## 2022-03-21 PROCEDURE — 99284 EMERGENCY DEPT VISIT MOD MDM: CPT

## 2022-03-21 PROCEDURE — 85730 THROMBOPLASTIN TIME PARTIAL: CPT

## 2022-03-21 PROCEDURE — 86901 BLOOD TYPING SEROLOGIC RH(D): CPT

## 2022-03-21 PROCEDURE — 85610 PROTHROMBIN TIME: CPT

## 2022-03-21 PROCEDURE — 84484 ASSAY OF TROPONIN QUANT: CPT

## 2022-03-21 PROCEDURE — 81001 URINALYSIS AUTO W/SCOPE: CPT

## 2022-03-21 PROCEDURE — 6370000000 HC RX 637 (ALT 250 FOR IP): Performed by: INTERNAL MEDICINE

## 2022-03-21 PROCEDURE — 86905 BLOOD TYPING RBC ANTIGENS: CPT

## 2022-03-21 PROCEDURE — 83605 ASSAY OF LACTIC ACID: CPT

## 2022-03-21 RX ORDER — LISINOPRIL 20 MG/1
40 TABLET ORAL EVERY MORNING
Status: DISCONTINUED | OUTPATIENT
Start: 2022-03-22 | End: 2022-03-23 | Stop reason: HOSPADM

## 2022-03-21 RX ORDER — DEXTROSE MONOHYDRATE 50 MG/ML
100 INJECTION, SOLUTION INTRAVENOUS PRN
Status: DISCONTINUED | OUTPATIENT
Start: 2022-03-21 | End: 2022-03-23 | Stop reason: HOSPADM

## 2022-03-21 RX ORDER — ONDANSETRON 4 MG/1
4 TABLET, ORALLY DISINTEGRATING ORAL EVERY 8 HOURS PRN
Status: DISCONTINUED | OUTPATIENT
Start: 2022-03-21 | End: 2022-03-23 | Stop reason: HOSPADM

## 2022-03-21 RX ORDER — ACETAMINOPHEN 650 MG/1
650 SUPPOSITORY RECTAL EVERY 6 HOURS PRN
Status: DISCONTINUED | OUTPATIENT
Start: 2022-03-21 | End: 2022-03-23 | Stop reason: HOSPADM

## 2022-03-21 RX ORDER — AMLODIPINE BESYLATE 5 MG/1
5 TABLET ORAL DAILY
Status: DISCONTINUED | OUTPATIENT
Start: 2022-03-22 | End: 2022-03-23

## 2022-03-21 RX ORDER — NICOTINE POLACRILEX 4 MG
15 LOZENGE BUCCAL PRN
Status: DISCONTINUED | OUTPATIENT
Start: 2022-03-21 | End: 2022-03-23 | Stop reason: HOSPADM

## 2022-03-21 RX ORDER — DEXTROSE MONOHYDRATE 25 G/50ML
12.5 INJECTION, SOLUTION INTRAVENOUS PRN
Status: DISCONTINUED | OUTPATIENT
Start: 2022-03-21 | End: 2022-03-23 | Stop reason: CLARIF

## 2022-03-21 RX ORDER — SODIUM CHLORIDE 0.9 % (FLUSH) 0.9 %
5-40 SYRINGE (ML) INJECTION EVERY 12 HOURS SCHEDULED
Status: DISCONTINUED | OUTPATIENT
Start: 2022-03-21 | End: 2022-03-23 | Stop reason: HOSPADM

## 2022-03-21 RX ORDER — SODIUM CHLORIDE 0.9 % (FLUSH) 0.9 %
5-40 SYRINGE (ML) INJECTION PRN
Status: DISCONTINUED | OUTPATIENT
Start: 2022-03-21 | End: 2022-03-23 | Stop reason: HOSPADM

## 2022-03-21 RX ORDER — PAROXETINE 10 MG/1
10 TABLET, FILM COATED ORAL EVERY MORNING
Status: DISCONTINUED | OUTPATIENT
Start: 2022-03-22 | End: 2022-03-23 | Stop reason: HOSPADM

## 2022-03-21 RX ORDER — ACETAMINOPHEN 325 MG/1
650 TABLET ORAL EVERY 6 HOURS PRN
Status: DISCONTINUED | OUTPATIENT
Start: 2022-03-21 | End: 2022-03-23 | Stop reason: HOSPADM

## 2022-03-21 RX ORDER — GABAPENTIN 400 MG/1
400 CAPSULE ORAL EVERY EVENING
Status: DISCONTINUED | OUTPATIENT
Start: 2022-03-21 | End: 2022-03-23 | Stop reason: HOSPADM

## 2022-03-21 RX ORDER — ONDANSETRON 2 MG/ML
4 INJECTION INTRAMUSCULAR; INTRAVENOUS EVERY 6 HOURS PRN
Status: DISCONTINUED | OUTPATIENT
Start: 2022-03-21 | End: 2022-03-23 | Stop reason: HOSPADM

## 2022-03-21 RX ORDER — PANTOPRAZOLE SODIUM 40 MG/10ML
80 INJECTION, POWDER, LYOPHILIZED, FOR SOLUTION INTRAVENOUS ONCE
Status: COMPLETED | OUTPATIENT
Start: 2022-03-21 | End: 2022-03-21

## 2022-03-21 RX ORDER — SODIUM CHLORIDE 9 MG/ML
INJECTION, SOLUTION INTRAVENOUS CONTINUOUS
Status: DISCONTINUED | OUTPATIENT
Start: 2022-03-21 | End: 2022-03-23 | Stop reason: HOSPADM

## 2022-03-21 RX ORDER — SODIUM CHLORIDE 9 MG/ML
25 INJECTION, SOLUTION INTRAVENOUS PRN
Status: DISCONTINUED | OUTPATIENT
Start: 2022-03-21 | End: 2022-03-23 | Stop reason: HOSPADM

## 2022-03-21 RX ORDER — POLYETHYLENE GLYCOL 3350 17 G/17G
17 POWDER, FOR SOLUTION ORAL DAILY PRN
Status: DISCONTINUED | OUTPATIENT
Start: 2022-03-21 | End: 2022-03-23 | Stop reason: HOSPADM

## 2022-03-21 RX ORDER — ONDANSETRON 2 MG/ML
4 INJECTION INTRAMUSCULAR; INTRAVENOUS EVERY 6 HOURS PRN
Status: DISCONTINUED | OUTPATIENT
Start: 2022-03-21 | End: 2022-03-22 | Stop reason: SDUPTHER

## 2022-03-21 RX ORDER — DIAZEPAM 5 MG/1
5 TABLET ORAL EVERY 6 HOURS PRN
Status: DISCONTINUED | OUTPATIENT
Start: 2022-03-21 | End: 2022-03-23 | Stop reason: HOSPADM

## 2022-03-21 RX ORDER — GLIPIZIDE 5 MG/1
5 TABLET ORAL
Status: DISCONTINUED | OUTPATIENT
Start: 2022-03-22 | End: 2022-03-23 | Stop reason: HOSPADM

## 2022-03-21 RX ORDER — OXYCODONE AND ACETAMINOPHEN 7.5; 325 MG/1; MG/1
1 TABLET ORAL EVERY 6 HOURS PRN
Status: DISCONTINUED | OUTPATIENT
Start: 2022-03-21 | End: 2022-03-23 | Stop reason: HOSPADM

## 2022-03-21 RX ORDER — FENTANYL CITRATE 50 UG/ML
50 INJECTION, SOLUTION INTRAMUSCULAR; INTRAVENOUS ONCE
Status: COMPLETED | OUTPATIENT
Start: 2022-03-21 | End: 2022-03-21

## 2022-03-21 RX ADMIN — INSULIN LISPRO 1 UNITS: 100 INJECTION, SOLUTION INTRAVENOUS; SUBCUTANEOUS at 21:31

## 2022-03-21 RX ADMIN — IOPAMIDOL 75 ML: 755 INJECTION, SOLUTION INTRAVENOUS at 15:47

## 2022-03-21 RX ADMIN — PANTOPRAZOLE SODIUM 80 MG: 40 INJECTION, POWDER, FOR SOLUTION INTRAVENOUS at 19:35

## 2022-03-21 RX ADMIN — FENTANYL CITRATE 50 MCG: 50 INJECTION INTRAMUSCULAR; INTRAVENOUS at 19:50

## 2022-03-21 ASSESSMENT — PAIN SCALES - GENERAL: PAINLEVEL_OUTOF10: 8

## 2022-03-21 NOTE — ED PROVIDER NOTES
As physician assistant-in-triage, I performed a medical screening history and physical exam on this patient. HISTORY OF PRESENT ILLNESS  Gricelda Aldana is a 77 y.o. female who presents to the ED with about a week of abdominal pain, diarrhea, nausea. She reports that she went to her primary care doctor, Dr. Rick Johnson, on 3/17/2022 and was diagnosed with a UTI and told that she likely had diverticulitis and was started on metronidazole as well as ciprofloxacin and has been taking his medications but having worsening of symptoms. She now reports that she is having black stools and that her PCP advised her to proceed to the emergency department for further evaluation. Patient reports associated lightheadedness. Patient denies fever, chills, hematochezia, emesis, hematemesis, chest pain, shortness of breath, syncope. PHYSICAL EXAM  BP (!) 171/84   Pulse 100   Resp 20   Ht 4' 11\" (1.499 m)   Wt 170 lb (77.1 kg)   SpO2 94%   BMI 34.34 kg/m²     On exam, the patient appears well-hydrated, well-nourished, and in no acute distress. Mucous membranes are moist. Speech is clear. Breathing is unlabored. Skin is dry. Mental status is normal. Moves all extremities, and is without facial droop. PLAN:  Diagnostics initiated as needed. Please see the full history, physical exam, and final disposition note by the other provider in the main emergency department. Comment: Please note this report has been produced using speech recognition software and may contain errors related to that system including errors in grammar, punctuation, and spelling, as well as words and phrases that may be inappropriate. If there are any questions or concerns please feel free to contact the dictating provider for clarification.         Nestor Stein PA-C  03/21/22 [id number]

## 2022-03-21 NOTE — ED NOTES
Patient presents to the ER with c/o of bilateral flank/abdominal pain & diarrhea. Patient states she went to her family doctor & they gave her some medication & diagnosed her with colitis. Patient states stools are now dark.  Patient is alert & oriented x 4     Frantz Presley RN  03/21/22 [id number]

## 2022-03-22 ENCOUNTER — ANESTHESIA (OUTPATIENT)
Dept: ENDOSCOPY | Age: 67
DRG: 379 | End: 2022-03-22
Payer: MEDICARE

## 2022-03-22 ENCOUNTER — ANESTHESIA EVENT (OUTPATIENT)
Dept: ENDOSCOPY | Age: 67
DRG: 379 | End: 2022-03-22
Payer: MEDICARE

## 2022-03-22 VITALS — OXYGEN SATURATION: 100 % | SYSTOLIC BLOOD PRESSURE: 155 MMHG | DIASTOLIC BLOOD PRESSURE: 62 MMHG

## 2022-03-22 LAB
ALBUMIN SERPL-MCNC: 4.2 GM/DL (ref 3.4–5)
ALP BLD-CCNC: 96 IU/L (ref 40–128)
ALT SERPL-CCNC: 33 U/L (ref 10–40)
ANION GAP SERPL CALCULATED.3IONS-SCNC: 14 MMOL/L (ref 4–16)
AST SERPL-CCNC: 37 IU/L (ref 15–37)
BASOPHILS ABSOLUTE: 0.1 K/CU MM
BASOPHILS RELATIVE PERCENT: 0.5 % (ref 0–1)
BILIRUB SERPL-MCNC: 0.3 MG/DL (ref 0–1)
BUN BLDV-MCNC: 7 MG/DL (ref 6–23)
CALCIUM SERPL-MCNC: 8.7 MG/DL (ref 8.3–10.6)
CHLORIDE BLD-SCNC: 97 MMOL/L (ref 99–110)
CO2: 24 MMOL/L (ref 21–32)
CREAT SERPL-MCNC: 0.5 MG/DL (ref 0.6–1.1)
DIFFERENTIAL TYPE: ABNORMAL
EOSINOPHILS ABSOLUTE: 0.1 K/CU MM
EOSINOPHILS RELATIVE PERCENT: 1.4 % (ref 0–3)
GFR AFRICAN AMERICAN: >60 ML/MIN/1.73M2
GFR NON-AFRICAN AMERICAN: >60 ML/MIN/1.73M2
GLUCOSE BLD-MCNC: 235 MG/DL (ref 70–99)
GLUCOSE BLD-MCNC: 263 MG/DL (ref 70–99)
GLUCOSE BLD-MCNC: 270 MG/DL (ref 70–99)
GLUCOSE BLD-MCNC: 313 MG/DL (ref 70–99)
GLUCOSE BLD-MCNC: 317 MG/DL (ref 70–99)
HCT VFR BLD CALC: 41.8 % (ref 37–47)
HEMOGLOBIN: 13.3 GM/DL (ref 12.5–16)
IMMATURE NEUTROPHIL %: 0.3 % (ref 0–0.43)
LYMPHOCYTES ABSOLUTE: 2.8 K/CU MM
LYMPHOCYTES RELATIVE PERCENT: 28.3 % (ref 24–44)
MCH RBC QN AUTO: 26.9 PG (ref 27–31)
MCHC RBC AUTO-ENTMCNC: 31.8 % (ref 32–36)
MCV RBC AUTO: 84.4 FL (ref 78–100)
MONOCYTES ABSOLUTE: 0.8 K/CU MM
MONOCYTES RELATIVE PERCENT: 8.4 % (ref 0–4)
NUCLEATED RBC %: 0 %
PDW BLD-RTO: 13.4 % (ref 11.7–14.9)
PLATELET # BLD: 227 K/CU MM (ref 140–440)
PMV BLD AUTO: 10.7 FL (ref 7.5–11.1)
POTASSIUM SERPL-SCNC: 3.7 MMOL/L (ref 3.5–5.1)
RBC # BLD: 4.95 M/CU MM (ref 4.2–5.4)
SEGMENTED NEUTROPHILS ABSOLUTE COUNT: 6.1 K/CU MM
SEGMENTED NEUTROPHILS RELATIVE PERCENT: 61.1 % (ref 36–66)
SODIUM BLD-SCNC: 135 MMOL/L (ref 135–145)
TOTAL IMMATURE NEUTOROPHIL: 0.03 K/CU MM
TOTAL NUCLEATED RBC: 0 K/CU MM
TOTAL PROTEIN: 6.3 GM/DL (ref 6.4–8.2)
WBC # BLD: 9.9 K/CU MM (ref 4–10.5)

## 2022-03-22 PROCEDURE — 3700000000 HC ANESTHESIA ATTENDED CARE: Performed by: INTERNAL MEDICINE

## 2022-03-22 PROCEDURE — 6360000002 HC RX W HCPCS

## 2022-03-22 PROCEDURE — 80053 COMPREHEN METABOLIC PANEL: CPT

## 2022-03-22 PROCEDURE — 2580000003 HC RX 258: Performed by: INTERNAL MEDICINE

## 2022-03-22 PROCEDURE — 82962 GLUCOSE BLOOD TEST: CPT

## 2022-03-22 PROCEDURE — 2709999900 HC NON-CHARGEABLE SUPPLY: Performed by: INTERNAL MEDICINE

## 2022-03-22 PROCEDURE — 6370000000 HC RX 637 (ALT 250 FOR IP): Performed by: INTERNAL MEDICINE

## 2022-03-22 PROCEDURE — 6360000002 HC RX W HCPCS: Performed by: INTERNAL MEDICINE

## 2022-03-22 PROCEDURE — 36415 COLL VENOUS BLD VENIPUNCTURE: CPT

## 2022-03-22 PROCEDURE — 43239 EGD BIOPSY SINGLE/MULTIPLE: CPT | Performed by: INTERNAL MEDICINE

## 2022-03-22 PROCEDURE — 2700000000 HC OXYGEN THERAPY PER DAY

## 2022-03-22 PROCEDURE — 88342 IMHCHEM/IMCYTCHM 1ST ANTB: CPT | Performed by: PATHOLOGY

## 2022-03-22 PROCEDURE — 2500000003 HC RX 250 WO HCPCS

## 2022-03-22 PROCEDURE — 1200000000 HC SEMI PRIVATE

## 2022-03-22 PROCEDURE — 88305 TISSUE EXAM BY PATHOLOGIST: CPT | Performed by: PATHOLOGY

## 2022-03-22 PROCEDURE — 93005 ELECTROCARDIOGRAM TRACING: CPT | Performed by: INTERNAL MEDICINE

## 2022-03-22 PROCEDURE — 0DB68ZX EXCISION OF STOMACH, VIA NATURAL OR ARTIFICIAL OPENING ENDOSCOPIC, DIAGNOSTIC: ICD-10-PCS | Performed by: INTERNAL MEDICINE

## 2022-03-22 PROCEDURE — 99222 1ST HOSP IP/OBS MODERATE 55: CPT | Performed by: INTERNAL MEDICINE

## 2022-03-22 PROCEDURE — 3700000001 HC ADD 15 MINUTES (ANESTHESIA): Performed by: INTERNAL MEDICINE

## 2022-03-22 PROCEDURE — 85025 COMPLETE CBC W/AUTO DIFF WBC: CPT

## 2022-03-22 PROCEDURE — 2500000003 HC RX 250 WO HCPCS: Performed by: INTERNAL MEDICINE

## 2022-03-22 PROCEDURE — 3609012400 HC EGD TRANSORAL BIOPSY SINGLE/MULTIPLE: Performed by: INTERNAL MEDICINE

## 2022-03-22 RX ORDER — MORPHINE SULFATE 2 MG/ML
2 INJECTION, SOLUTION INTRAMUSCULAR; INTRAVENOUS ONCE
Status: COMPLETED | OUTPATIENT
Start: 2022-03-22 | End: 2022-03-22

## 2022-03-22 RX ORDER — INSULIN GLARGINE 100 [IU]/ML
6 INJECTION, SOLUTION SUBCUTANEOUS NIGHTLY
Status: DISCONTINUED | OUTPATIENT
Start: 2022-03-22 | End: 2022-03-23

## 2022-03-22 RX ORDER — PROPOFOL 10 MG/ML
INJECTION, EMULSION INTRAVENOUS PRN
Status: DISCONTINUED | OUTPATIENT
Start: 2022-03-22 | End: 2022-03-22 | Stop reason: SDUPTHER

## 2022-03-22 RX ORDER — LIDOCAINE HYDROCHLORIDE 20 MG/ML
INJECTION, SOLUTION EPIDURAL; INFILTRATION; INTRACAUDAL; PERINEURAL PRN
Status: DISCONTINUED | OUTPATIENT
Start: 2022-03-22 | End: 2022-03-22 | Stop reason: SDUPTHER

## 2022-03-22 RX ADMIN — GABAPENTIN 400 MG: 400 CAPSULE ORAL at 16:49

## 2022-03-22 RX ADMIN — GABAPENTIN 400 MG: 400 CAPSULE ORAL at 00:17

## 2022-03-22 RX ADMIN — MORPHINE SULFATE 2 MG: 2 INJECTION, SOLUTION INTRAMUSCULAR; INTRAVENOUS at 10:02

## 2022-03-22 RX ADMIN — OXYCODONE HYDROCHLORIDE AND ACETAMINOPHEN 1 TABLET: 7.5; 325 TABLET ORAL at 20:37

## 2022-03-22 RX ADMIN — PIPERACILLIN AND TAZOBACTAM 3375 MG: 3; .375 INJECTION, POWDER, LYOPHILIZED, FOR SOLUTION INTRAVENOUS at 09:09

## 2022-03-22 RX ADMIN — SODIUM CHLORIDE: 9 INJECTION, SOLUTION INTRAVENOUS at 12:43

## 2022-03-22 RX ADMIN — METRONIDAZOLE 500 MG: 500 INJECTION, SOLUTION INTRAVENOUS at 02:00

## 2022-03-22 RX ADMIN — PROPOFOL 120 MG: 10 INJECTION, EMULSION INTRAVENOUS at 15:22

## 2022-03-22 RX ADMIN — LIDOCAINE HYDROCHLORIDE 100 MG: 20 INJECTION, SOLUTION EPIDURAL; INFILTRATION; INTRACAUDAL; PERINEURAL at 15:22

## 2022-03-22 RX ADMIN — OXYCODONE HYDROCHLORIDE AND ACETAMINOPHEN 1 TABLET: 7.5; 325 TABLET ORAL at 00:17

## 2022-03-22 RX ADMIN — INSULIN LISPRO 2 UNITS: 100 INJECTION, SOLUTION INTRAVENOUS; SUBCUTANEOUS at 21:39

## 2022-03-22 RX ADMIN — METFORMIN HYDROCHLORIDE 1000 MG: 500 TABLET ORAL at 16:49

## 2022-03-22 RX ADMIN — INSULIN GLARGINE 6 UNITS: 100 INJECTION, SOLUTION SUBCUTANEOUS at 20:37

## 2022-03-22 RX ADMIN — SODIUM CHLORIDE: 9 INJECTION, SOLUTION INTRAVENOUS at 00:23

## 2022-03-22 RX ADMIN — PIPERACILLIN AND TAZOBACTAM 3375 MG: 3; .375 INJECTION, POWDER, LYOPHILIZED, FOR SOLUTION INTRAVENOUS at 00:23

## 2022-03-22 RX ADMIN — METRONIDAZOLE 500 MG: 500 INJECTION, SOLUTION INTRAVENOUS at 09:06

## 2022-03-22 ASSESSMENT — PAIN DESCRIPTION - PAIN TYPE
TYPE: ACUTE PAIN
TYPE: OTHER (COMMENT)
TYPE: CHRONIC PAIN
TYPE: CHRONIC PAIN

## 2022-03-22 ASSESSMENT — PAIN DESCRIPTION - FREQUENCY
FREQUENCY: CONTINUOUS

## 2022-03-22 ASSESSMENT — PAIN DESCRIPTION - LOCATION
LOCATION: ABDOMEN;BACK
LOCATION: BACK;ABDOMEN
LOCATION: ABDOMEN;BACK
LOCATION: ABDOMEN

## 2022-03-22 ASSESSMENT — PAIN SCALES - GENERAL
PAINLEVEL_OUTOF10: 8
PAINLEVEL_OUTOF10: 7
PAINLEVEL_OUTOF10: 7
PAINLEVEL_OUTOF10: 8
PAINLEVEL_OUTOF10: 7
PAINLEVEL_OUTOF10: 8
PAINLEVEL_OUTOF10: 7

## 2022-03-22 ASSESSMENT — PAIN - FUNCTIONAL ASSESSMENT: PAIN_FUNCTIONAL_ASSESSMENT: 0-10

## 2022-03-22 ASSESSMENT — PAIN DESCRIPTION - DESCRIPTORS
DESCRIPTORS: ACHING

## 2022-03-22 ASSESSMENT — PAIN DESCRIPTION - ONSET
ONSET: ON-GOING

## 2022-03-22 ASSESSMENT — LIFESTYLE VARIABLES: SMOKING_STATUS: 1

## 2022-03-22 ASSESSMENT — PAIN DESCRIPTION - PROGRESSION: CLINICAL_PROGRESSION: GRADUALLY IMPROVING

## 2022-03-22 ASSESSMENT — PAIN SCALES - WONG BAKER: WONGBAKER_NUMERICALRESPONSE: 2

## 2022-03-22 NOTE — CONSULTS
Department of Internal Medicine  Gastroenterology Consult Note  Jerod Richard MD      Reason for Consult:  Melena    Primary Care Physician:  Genesis Chavez MD    History Obtained From:  patient    HISTORY OF PRESENT ILLNESS:              The patient is a 77 y.o.  female who developed abdominal pain, diarrhea, nausea. She was seen by her primary care doctor who diagnosed her as diverticulitits and she was placed on metronidazole and cipro. She subsequently developed melena and was advised to go to the ED where she was evaluated and admitted. The H/H on 3/21 was 14.7/47.5, on 3/22 it had dropped to 13.3/41. 8. She does have a history of a duodenal ulcer, has had two episodes of melena since admission.       Past Medical History:        Diagnosis Date    Anxiety     Bipolar disorder ([address]     Choking     \"Been Choking On Food For 6 Months\"    Chronic back pain     Depression     Diabetes mellitus ([address] Dx 2014    Difficult intubation     Fibromyalgia     History of echocardiogram 03/05/2021    EF 75-80%    History of stress test 03/10/2021    NORMAL STUDY    Hx of cardiovascular stress test 03/10/2021    Normal study    Hyperlipidemia     Hypertension     Panic attacks     Pneumonia Dx 1990's    PTSD (post-traumatic stress disorder)     Shortness of breath        Past Surgical History:        Procedure Laterality Date    BACK SURGERY  2003    Lower Back, No Hardware    ENDOSCOPY, COLON, DIAGNOSTIC  Last Done 3-17    EYE SURGERY Bilateral 2000's    Cataracts With Lens Implants    HERNIA REPAIR  1970's    Abdominal Hernia Repair    HYSTERECTOMY VAGINAL  1980's    KNEE SURGERY Right 2014    Benign \"Tumor\" Removed Right Knee    LEG SURGERY Right 2000's    \"Mole Removed From Back Of Right Calf, Became Infected , They Had To Go Back In\"    OTHER SURGICAL HISTORY  1990's    \"Tubes And Ovaries Removed In 1990's\"    ROTATOR CUFF REPAIR Right 2014    UPPER GASTROINTESTINAL ENDOSCOPY 05/11/2017    With Dilation       Medications Prior to Admission:    Prior to Admission medications    Medication Sig Start Date End Date Taking? Authorizing Provider   amLODIPine-benazepril (LOTREL) 5-10 MG per capsule 1 capsule daily   Yes Historical Provider, MD   gabapentin (NEURONTIN) 400 MG capsule Take 400 mg by mouth every evening. Yes Historical Provider, MD   glimepiride (AMARYL) 2 MG tablet Take 2 mg by mouth every morning (before breakfast)   Yes Historical Provider, MD   lisinopril (PRINIVIL;ZESTRIL) 40 MG tablet Take 1 tablet by mouth every morning 3/4/21  Yes Velma Vallecillo MD   diazepam (VALIUM) 5 MG tablet Take 5 mg by mouth every 6 hours as needed for Anxiety   Yes Historical Provider, MD   omeprazole (PRILOSEC) 10 MG delayed release capsule Take 10 mg by mouth daily   Yes Historical Provider, MD   metFORMIN (GLUCOPHAGE) 1000 MG tablet Take 1,000 mg by mouth 2 times daily (with meals)   Yes Historical Provider, MD   PARoxetine (PAXIL) 10 MG tablet Take 10 mg by mouth every morning. Yes Historical Provider, MD   oxyCODONE-acetaminophen (PERCOCET) 7.5-325 MG per tablet Take 1 tablet by mouth as needed  . Yes Historical Provider, MD   polyethylene glycol (GLYCOLAX) 17 GM/SCOOP powder Take 17 g by mouth daily    Historical Provider, MD       Allergies: Allergies   Allergen Reactions    Sulfa Antibiotics Nausea And Vomiting   . Social History:    TOBACCO:  No  ETOH:  No    Family History: No family history of gi disease. No family history on file. REVIEW OF SYSTEMS: (POSITIVES WILL BE UNDERLINED)  CONSTITUTIONAL:    Weight change,fatigue, fever, chills  EYES:  Diplopia, change in vision  EARS:  hearing loss, tinnitus, vertigo  NOSE:   epistaxis  MOUTH/THROAT:     hoarseness, sore throat. RESPIRATORY:  SOB,  cough, sputum, hemoptysis  CARDIOVASCULAR : chest pain,palpitations, dyspnea exertion, orthopnea, paroxysmal nocturnal dyspnea, pedal edema.   GASTROINTESTINAL:  See HPI  GENITOURINARY:   dysuria, hematuria, . HEMATOLOGIC/LYMPHATIC:   Anemia, bleeding tendencies. MUSCULOSKELETAL:    myalgias,  joint pain  NEUROLOGICAL:   Loss of Consciousness, paresthesias, anesthesias, focal weakness  SKIN :   History of dermatitis, rashes  PSYCHIATRIC:  depression, , anxiety past psychosis  ENDOCRINE:  History of diabetes, thyroid disease  ALL/IMM : allergies, rashes    PHYSICAL EXAM:    Vitals:  BP (!) 164/71   Pulse 79   Temp 98.2 °F (36.8 °C) (Oral)   Resp 18   Ht 4' 11\" (1.499 m)   Wt 170 lb (77.1 kg)   SpO2 99%   BMI 34.34 kg/m²   CONSTITUTIONAL: alert, cooperative, no apparent distress,   EYES:  pupils equal, round and reactive to light and sclera clear  ENT:  normocepalic, without obvious abnormality  NECK:  supple, symmetrical, trachea midline  HEMATOLOGIC/LYMPHATICS:  no cervical lymphadenopathy and no supraclavicular lymphadenopathy  LUNGS:  clear to auscultation  CARDIOVASCULAR:  regular rate and rhythm and no murmur noted  ABDOMEN:  Soft, non-tender with normal bowel sounds. No organomegaly or masses  NEUROLOGIC: no focal deficit detected  SKIN:  no lesions  EXTREMITIES: no clubbing, cyanosis, or edema    IMPRESSION:  1) Melena, likely from upper gi source. RECOMMENDATIONS:  1) IV protonix, npo, will schedule EGD for today.            Electronically signed by Keren Maurice MD on 3/22/2022 at 7:36 AM

## 2022-03-22 NOTE — PROGRESS NOTES
3/22/2022    Pt admitted last night with abd pain and melena. She stillhas some LLQ pain. Lungs clear, heart regular, abd soft with LLQ tenderness. No leg edema. Glucose has been elevated. Hgb dropped overnight with hydration. Sodium 135 MMOL/L     Potassium 3.7 MMOL/L    Chloride 97 Low  mMol/L    CO2 24 MMOL/L    BUN 7 MG/DL    CREATININE 0.5 Low  MG/DL    Glucose 313 High  MG/DL    Calcium 8.7 MG/DL    Albumin 4.2 GM/DL    Total Protein 6.3 Low  GM/DL    Total Bilirubin 0.3 MG/DL    ALT 33 U/L    AST 37 IU/L    Alkaline Phosphatase 96 IU/L    GFR Non-African American >60 mL/min/1.73m2     WBC 9.9 K/CU MM     RBC 4.95 M/CU MM    Hemoglobin 13.3 GM/DL    Hematocrit 41.8 %    MCV 84.4 FL    MCH 26.9 Low  PG    MCHC 31.8 Low  %    RDW 13.4 %    Platelets 198 K/CU MM    MPV 10.7 FL    Differential Type AUTOMATED DIFFERENTIAL    Segs Relative 61.1 %    Lymphocytes % 28.3 %    Monocytes % 8.4 High  %     Pt had EGD, no ulcer noted. Will watch hgb overnight. Will add basal insulin and follow glucose level, as high glucose will retard healing of colitis.   Yvonne David MD

## 2022-03-22 NOTE — ED NOTES
Report given to [facility]. Pt to be transported when room clean.       Marivel Box RN  03/21/22 [id number]

## 2022-03-22 NOTE — CONSULTS
Discussed consult with primary nurse. Consult canceled. Nurse states patient doesn't need second PIV @ this time. Please consult IV/PICC team if patient's needs change.

## 2022-03-22 NOTE — ANESTHESIA PRE PROCEDURE
Department of Anesthesiology  Preprocedure Note       Name:  Roni Arrington   Age:  77 y.o.  :  1955                                          MRN:  [id number]         Date:  3/22/2022      Surgeon: Servando Loja):  Samir Sharpe MD    Procedure: Procedure(s):  EGD ESOPHAGOGASTRODUODENOSCOPY    Medications prior to admission:   Prior to Admission medications    Medication Sig Start Date End Date Taking? Authorizing Provider   amLODIPine-benazepril (LOTREL) 5-10 MG per capsule 1 capsule daily   Yes Historical Provider, MD   gabapentin (NEURONTIN) 400 MG capsule Take 400 mg by mouth every evening. Yes Historical Provider, MD   glimepiride (AMARYL) 2 MG tablet Take 2 mg by mouth every morning (before breakfast)   Yes Historical Provider, MD   lisinopril (PRINIVIL;ZESTRIL) 40 MG tablet Take 1 tablet by mouth every morning 3/4/21  Yes Ab Lane MD   diazepam (VALIUM) 5 MG tablet Take 5 mg by mouth every 6 hours as needed for Anxiety   Yes Historical Provider, MD   omeprazole (PRILOSEC) 10 MG delayed release capsule Take 10 mg by mouth daily   Yes Historical Provider, MD   metFORMIN (GLUCOPHAGE) 1000 MG tablet Take 1,000 mg by mouth 2 times daily (with meals)   Yes Historical Provider, MD   PARoxetine (PAXIL) 10 MG tablet Take 10 mg by mouth every morning. Yes Historical Provider, MD   oxyCODONE-acetaminophen (PERCOCET) 7.5-325 MG per tablet Take 1 tablet by mouth as needed  .    Yes Historical Provider, MD   polyethylene glycol (GLYCOLAX) 17 GM/SCOOP powder Take 17 g by mouth daily    Historical Provider, MD       Current medications:    Current Facility-Administered Medications   Medication Dose Route Frequency Provider Last Rate Last Admin    diazePAM (VALIUM) tablet 5 mg  5 mg Oral Q6H PRN Nilay Hernandez MD        gabapentin (NEURONTIN) capsule 400 mg  400 mg Oral QPM Nilay Hernanedz MD   400 mg at 22 0017    glipiZIDE (GLUCOTROL) tablet 5 mg  5 mg Oral QAM  Brenda Ricks Adriana Hamilton MD        lisinopril (PRINIVIL;ZESTRIL) tablet 40 mg  40 mg Oral QANAYE Yen MD        metFORMIN (GLUCOPHAGE) tablet 1,000 mg  1,000 mg Oral BID WC Christpoher Yen MD        oxyCODONE-acetaminophen (PERCOCET) 7.5-325 MG per tablet 1 tablet  1 tablet Oral Q6H PRN Christopher Yen MD   1 tablet at 03/22/22 0017    PARoxetine (PAXIL) tablet 10 mg  10 mg Oral QAM Christopher Yen MD        amLODIPine (NORVASC) tablet 5 mg  5 mg Oral Daily Christopher Yen MD        sodium chloride flush 0.9 % injection 5-40 mL  5-40 mL IntraVENous 2 times per day Christopher Yen MD        sodium chloride flush 0.9 % injection 5-40 mL  5-40 mL IntraVENous PRN Christopher Yen MD        0.9 % sodium chloride infusion  25 mL IntraVENous PRN Christopher Yen MD        enoxaparin (LOVENOX) injection 40 mg  40 mg SubCUTAneous Daily Christopher Yen MD        ondansetron (ZOFRAN-ODT) disintegrating tablet 4 mg  4 mg Oral Q8H PRN Christopher Yen MD        Or    ondansetron [facility]) injection 4 mg  4 mg IntraVENous Q6H PRN Christopher Yen MD        polyethylene glycol [facility]) packet 17 g  17 g Oral Daily PRN Christopher Yen MD        acetaminophen (TYLENOL) tablet 650 mg  650 mg Oral Q6H PRN Christopher Yen MD        Or    acetaminophen (TYLENOL) suppository 650 mg  650 mg Rectal Q6H PRN Christopher Yen MD        0.9 % sodium chloride infusion   IntraVENous Continuous Christopher Yen MD 75 mL/hr at 03/22/22 0023 New Bag at 03/22/22 0023    metronidazole (FLAGYL) 500 mg in NaCl 100 mL IVPB premix  500 mg IntraVENous Q8H Christopher Yen  mL/hr at 03/22/22 0906 500 mg at 03/22/22 0906    piperacillin-tazobactam (ZOSYN) 3,375 mg in dextrose 5 % 50 mL IVPB extended infusion (mini-bag)  3,375 mg IntraVENous Q8H Christopher Yen MD 12.5 mL/hr at 03/22/22 0909 3,375 mg at 03/22/22 0909    insulin lispro (HUMALOG) injection vial 0-6 Units  0-6 Units SubCUTAneous TID WC Crys Lafleur MD   3 Units at 03/22/22 0854    insulin lispro (HUMALOG) injection vial 0-3 Units  0-3 Units SubCUTAneous Nightly Crys Lafleur MD   1 Units at 03/21/22 2131    glucose (GLUTOSE) 40 % oral gel 15 g  15 g Oral PRN Crys Lafleur MD        dextrose 50 % IV solution  12.5 g IntraVENous PRN Crys Lafleur MD        glucagon (rDNA) injection 1 mg  1 mg IntraMUSCular PRN Crys Lafleur MD        dextrose 5 % solution  100 mL/hr IntraVENous PRN Crys Lafleur MD           Allergies:     Allergies   Allergen Reactions    Sulfa Antibiotics Nausea And Vomiting       Problem List:    Patient Active Problem List   Diagnosis Code    Melena K92.1    Left lower quadrant abdominal pain R10.32       Past Medical History:        Diagnosis Date    Anxiety     Bipolar disorder ([address]     Choking     \"Been Choking On Food For 6 Months\"    Chronic back pain     Depression     Diabetes mellitus ([facility] [address] Dx 2014    Difficult intubation     Fibromyalgia     History of echocardiogram 03/05/2021    EF 75-80%    History of stress test 03/10/2021    NORMAL STUDY    Hx of cardiovascular stress test 03/10/2021    Normal study    Hyperlipidemia     Hypertension     Panic attacks     Pneumonia Dx 1990's    PTSD (post-traumatic stress disorder)     Shortness of breath        Past Surgical History:        Procedure Laterality Date    BACK SURGERY  2003    Lower Back, No Hardware    ENDOSCOPY, COLON, DIAGNOSTIC  Last Done 3-17    EYE SURGERY Bilateral 2000's    Cataracts With Lens Implants    HERNIA REPAIR  1970's    Abdominal Hernia Repair    HYSTERECTOMY VAGINAL  1980's    KNEE SURGERY Right 2014    Benign \"Tumor\" Removed Right Knee    LEG SURGERY Right 2000's    \"Mole Removed From Back Of Right Calf, Became Infected , They Had To Go Back In\"    OTHER SURGICAL HISTORY  1990's    \"Tubes And Ovaries Removed In 1990's\"    ROTATOR CUFF REPAIR Right 2014    UPPER GASTROINTESTINAL ENDOSCOPY  05/11/2017    With Dilation       Social History:    Social History     Tobacco Use    Smoking status: Current Every Day Smoker     Packs/day: 0.50     Years: 44.00     Pack years: 22.00     Types: Cigarettes     Start date: 1973    Smokeless tobacco: Never Used   Substance Use Topics    Alcohol use: No                                Ready to quit: Not Answered  Counseling given: Not Answered      Vital Signs (Current):   Vitals:    03/22/22 0102 03/22/22 0106 03/22/22 0125 03/22/22 0735   BP: (!) 142/54  (!) 164/71 (!) 165/75   Pulse:   79 84   Resp:   18 16   Temp:   36.8 °C (98.2 °F) 37.2 °C (99 °F)   TempSrc:   Oral Oral   SpO2: (!) 89% 94% 99% 96%   Weight:       Height:                                                  BP Readings from Last 3 Encounters:   03/22/22 (!) 165/75   03/26/21 138/72   03/04/21 (!) 142/92       NPO Status:                                                                                 BMI:   Wt Readings from Last 3 Encounters:   03/21/22 170 lb (77.1 kg)   03/26/21 172 lb 6.4 oz (78.2 kg)   03/04/21 175 lb 12.8 oz (79.7 kg)     Body mass index is 34.34 kg/m².     CBC:   Lab Results   Component Value Date    WBC 9.9 03/22/2022    RBC 4.95 03/22/2022    HGB 13.3 03/22/2022    HCT 41.8 03/22/2022    MCV 84.4 03/22/2022    RDW 13.4 03/22/2022     03/22/2022       CMP:   Lab Results   Component Value Date     03/22/2022    K 3.7 03/22/2022    CL 97 03/22/2022    CO2 24 03/22/2022    BUN 7 03/22/2022    CREATININE 0.5 03/22/2022    GFRAA >60 03/22/2022    LABGLOM >60 03/22/2022    GLUCOSE 313 03/22/2022    PROT 6.3 03/22/2022    CALCIUM 8.7 03/22/2022    BILITOT 0.3 03/22/2022    ALKPHOS 96 03/22/2022    AST 37 03/22/2022    ALT 33 03/22/2022       POC Tests:   Recent Labs     03/22/22  0709   POCGLU 270*       Coags:   Lab Results   Component Value Date    PROTIME 12.8 03/21/2022    INR 0.99 03/21/2022    APTT 18.0 03/21/2022       HCG (If Applicable): No results found for: PREGTESTUR, PREGSERUM, HCG, HCGQUANT     ABGs: No results found for: PHART, PO2ART, FJO0VIR, LII6GNC, BEART, J9WJPWZY     Type & Screen (If Applicable):  No results found for: LABABO, LABRH    Drug/Infectious Status (If Applicable):  No results found for: HIV, HEPCAB    COVID-19 Screening (If Applicable): No results found for: COVID19        Anesthesia Evaluation  Patient summary reviewed  Airway: Mallampati: III  TM distance: >3 FB   Neck ROM: full  Mouth opening: > = 3 FB Dental:          Pulmonary:   (+) pneumonia: resolved,  decreased breath sounds,  current smoker                           Cardiovascular:    (+) hypertension:, hyperlipidemia      ECG reviewed      Echocardiogram reviewed  Stress test reviewed             ROS comment:  Summary   Subcostal views not obtainable d/t hyperechoic liver. Left ventricular systolic function is hyperdynamic with an ejection fraction   of 75-80%. Mild concentric left ventricular hypertrophy. Grade I diastolic dysfunction. Normal pulmonary artery pressure. RVSP = 23 mmHg. No evidence of pericardial effusion. Signature      ------------------------------------------------------------------   Electronically signed by Maude Woods MD   (Interpreting physician) on 03/07/2021 at 04:09 PM   ------------------------------------------------------------------    Summary   Supervising physician Dr. Lauri Adams .   Normal LV function. NORMAL EDV   There is normal isotope uptake following exercise and at rest. There is no   evidence of exercise induced ischemia.   This is a normal study.      Recommendation      Recommendation: Routine follow-up.      Signatures      ------------------------------------------------------------------   Electronically signed by Maude Woods MD   (Interpreting cardiologist) on 03/12/2021 at 07:32    Normal sinus rhythm   Cannot rule out Anterior infarct (cited on or before 21-FEB-2021)   Abnormal ECG   When compared with ECG of 21-MAR-2022 13:04,   No significant change was found      Neuro/Psych:   (+) psychiatric history:depression/anxiety              ROS comment: Chronic pain   Fibromyalgia   clbp  GI/Hepatic/Renal:   (+) GERD:,           Endo/Other:    (+) DiabetesType II DM, poorly controlled, , .                 Abdominal:   (+) obese,           Vascular: Other Findings:           Anesthesia Plan      MAC     ASA 3       Induction: intravenous. Anesthetic plan and risks discussed with patient. Plan discussed with TAHMINA.                 MARCE Hernandez - TAHMINA   3/22/2022

## 2022-03-22 NOTE — BRIEF OP NOTE
Brief Postoperative Note      Patient: Everardo Vega  YOB: 1955  MRN: [id number]    Date of Procedure: 3/22/2022    Pre-Op Diagnosis: MELENA    Post-Op Diagnosis: Gastritis, bx taken to check for h pylori, minimal esophagitis, hiatal hernia. Procedure(s):  EGD BIOPSY    Surgeon(s):  Sunil Esteban MD    Assistant:  * No surgical staff found *    Anesthesia: Monitor Anesthesia Care    Estimated Blood Loss (mL): Minimal    Complications: None    Specimens:   ID Type Source Tests Collected by Time Destination   A : ANTRUM BX FOR H PYLORI Tissue Tissue SURGICAL PATHOLOGY Sunil Esteban MD 3/22/2022 1533        Implants:  * No implants in log *      Drains: * No LDAs found *    Findings: As above, will need colonoscopy as outpatient.      Electronically signed by Sunil Esteban MD on 3/22/2022 at 3:34 PM

## 2022-03-22 NOTE — ANESTHESIA POSTPROCEDURE EVALUATION
Department of Anesthesiology  Postprocedure Note    Patient: Sanjay Magallanes  MRN: [id number]  YOB: 1955  Date of evaluation: 3/22/2022  Time:  3:41 PM     Procedure Summary     Date: 03/22/22 Room / Location: [address]    Anesthesia Start: 6226 Anesthesia Stop: 7524    Procedure: EGD BIOPSY (N/A ) Diagnosis: (MELENA)    Surgeons: Ava Chacon MD Responsible Provider: Yulia Marie MD    Anesthesia Type: MAC ASA Status: 3          Anesthesia Type: MAC    Nazario Phase I:      Nazario Phase II:      Last vitals: Reviewed and per EMR flowsheets.        Anesthesia Post Evaluation    Patient location during evaluation: bedside  Patient participation: complete - patient participated  Level of consciousness: awake  Pain score: 0  Airway patency: patent  Nausea & Vomiting: no nausea and no vomiting  Complications: no  Cardiovascular status: blood pressure returned to baseline  Respiratory status: acceptable and room air  Hydration status: euvolemic

## 2022-03-22 NOTE — ED NOTES
Pt sats fluctuating from 86-93%. Pt placed on 3L NC in order to maintain sat above 91%.       Jayashree Torres RN  03/22/22 6945

## 2022-03-23 VITALS
HEIGHT: 59 IN | RESPIRATION RATE: 18 BRPM | SYSTOLIC BLOOD PRESSURE: 146 MMHG | HEART RATE: 84 BPM | TEMPERATURE: 97.4 F | BODY MASS INDEX: 34.27 KG/M2 | OXYGEN SATURATION: 95 % | DIASTOLIC BLOOD PRESSURE: 69 MMHG | WEIGHT: 170 LBS

## 2022-03-23 PROBLEM — E11.9 TYPE 2 DIABETES MELLITUS, WITHOUT LONG-TERM CURRENT USE OF INSULIN (HCC): Status: ACTIVE | Noted: 2022-03-23

## 2022-03-23 PROBLEM — I10 PRIMARY HYPERTENSION: Status: ACTIVE | Noted: 2022-03-23

## 2022-03-23 LAB
EKG ATRIAL RATE: 73 BPM
EKG DIAGNOSIS: NORMAL
EKG P AXIS: 22 DEGREES
EKG P-R INTERVAL: 142 MS
EKG Q-T INTERVAL: 428 MS
EKG QRS DURATION: 70 MS
EKG QTC CALCULATION (BAZETT): 471 MS
EKG R AXIS: -24 DEGREES
EKG T AXIS: 47 DEGREES
EKG VENTRICULAR RATE: 73 BPM
GLUCOSE BLD-MCNC: 194 MG/DL (ref 70–99)
GLUCOSE BLD-MCNC: 293 MG/DL (ref 70–99)
HCT VFR BLD CALC: 40.3 % (ref 37–47)
HEMOGLOBIN: 12.7 GM/DL (ref 12.5–16)

## 2022-03-23 PROCEDURE — 2500000003 HC RX 250 WO HCPCS: Performed by: INTERNAL MEDICINE

## 2022-03-23 PROCEDURE — 36415 COLL VENOUS BLD VENIPUNCTURE: CPT

## 2022-03-23 PROCEDURE — 93010 ELECTROCARDIOGRAM REPORT: CPT | Performed by: INTERNAL MEDICINE

## 2022-03-23 PROCEDURE — 6370000000 HC RX 637 (ALT 250 FOR IP): Performed by: INTERNAL MEDICINE

## 2022-03-23 PROCEDURE — 2580000003 HC RX 258: Performed by: INTERNAL MEDICINE

## 2022-03-23 PROCEDURE — 85018 HEMOGLOBIN: CPT

## 2022-03-23 PROCEDURE — 85014 HEMATOCRIT: CPT

## 2022-03-23 PROCEDURE — 82962 GLUCOSE BLOOD TEST: CPT

## 2022-03-23 PROCEDURE — 6360000002 HC RX W HCPCS: Performed by: INTERNAL MEDICINE

## 2022-03-23 PROCEDURE — 94761 N-INVAS EAR/PLS OXIMETRY MLT: CPT

## 2022-03-23 RX ORDER — AMLODIPINE BESYLATE 5 MG/1
5 TABLET ORAL 2 TIMES DAILY
Status: DISCONTINUED | OUTPATIENT
Start: 2022-03-23 | End: 2022-03-23 | Stop reason: HOSPADM

## 2022-03-23 RX ORDER — NICOTINE POLACRILEX 4 MG
15 LOZENGE BUCCAL PRN
Status: DISCONTINUED | OUTPATIENT
Start: 2022-03-23 | End: 2022-03-23 | Stop reason: HOSPADM

## 2022-03-23 RX ORDER — INSULIN GLARGINE 100 [IU]/ML
10 INJECTION, SOLUTION SUBCUTANEOUS NIGHTLY
Status: DISCONTINUED | OUTPATIENT
Start: 2022-03-23 | End: 2022-03-23 | Stop reason: HOSPADM

## 2022-03-23 RX ORDER — DEXTROSE MONOHYDRATE 25 G/50ML
12.5 INJECTION, SOLUTION INTRAVENOUS PRN
Status: DISCONTINUED | OUTPATIENT
Start: 2022-03-23 | End: 2022-03-23 | Stop reason: CLARIF

## 2022-03-23 RX ORDER — DEXTROSE MONOHYDRATE 50 MG/ML
100 INJECTION, SOLUTION INTRAVENOUS PRN
Status: DISCONTINUED | OUTPATIENT
Start: 2022-03-23 | End: 2022-03-23 | Stop reason: HOSPADM

## 2022-03-23 RX ADMIN — GLIPIZIDE 5 MG: 5 TABLET ORAL at 06:56

## 2022-03-23 RX ADMIN — PIPERACILLIN AND TAZOBACTAM 3375 MG: 3; .375 INJECTION, POWDER, LYOPHILIZED, FOR SOLUTION INTRAVENOUS at 02:47

## 2022-03-23 RX ADMIN — PIPERACILLIN AND TAZOBACTAM 3375 MG: 3; .375 INJECTION, POWDER, LYOPHILIZED, FOR SOLUTION INTRAVENOUS at 09:22

## 2022-03-23 RX ADMIN — OXYCODONE HYDROCHLORIDE AND ACETAMINOPHEN 1 TABLET: 7.5; 325 TABLET ORAL at 07:12

## 2022-03-23 RX ADMIN — METFORMIN HYDROCHLORIDE 1000 MG: 500 TABLET ORAL at 09:13

## 2022-03-23 RX ADMIN — PAROXETINE 10 MG: 10 TABLET, FILM COATED ORAL at 09:13

## 2022-03-23 RX ADMIN — SODIUM CHLORIDE, PRESERVATIVE FREE 10 ML: 5 INJECTION INTRAVENOUS at 09:09

## 2022-03-23 RX ADMIN — AMLODIPINE BESYLATE 5 MG: 5 TABLET ORAL at 09:09

## 2022-03-23 RX ADMIN — OXYCODONE HYDROCHLORIDE AND ACETAMINOPHEN 1 TABLET: 7.5; 325 TABLET ORAL at 14:25

## 2022-03-23 RX ADMIN — METRONIDAZOLE 500 MG: 500 INJECTION, SOLUTION INTRAVENOUS at 02:40

## 2022-03-23 RX ADMIN — ENOXAPARIN SODIUM 40 MG: 100 INJECTION SUBCUTANEOUS at 09:09

## 2022-03-23 RX ADMIN — SODIUM CHLORIDE, PRESERVATIVE FREE 10 ML: 5 INJECTION INTRAVENOUS at 06:54

## 2022-03-23 RX ADMIN — LISINOPRIL 40 MG: 20 TABLET ORAL at 09:09

## 2022-03-23 ASSESSMENT — PAIN DESCRIPTION - PROGRESSION
CLINICAL_PROGRESSION: GRADUALLY WORSENING
CLINICAL_PROGRESSION: GRADUALLY IMPROVING

## 2022-03-23 ASSESSMENT — PAIN DESCRIPTION - FREQUENCY
FREQUENCY: CONTINUOUS
FREQUENCY: CONTINUOUS

## 2022-03-23 ASSESSMENT — PAIN DESCRIPTION - DESCRIPTORS
DESCRIPTORS: ACHING
DESCRIPTORS: ACHING

## 2022-03-23 ASSESSMENT — PAIN SCALES - WONG BAKER
WONGBAKER_NUMERICALRESPONSE: 0
WONGBAKER_NUMERICALRESPONSE: 2
WONGBAKER_NUMERICALRESPONSE: 2

## 2022-03-23 ASSESSMENT — PAIN SCALES - GENERAL
PAINLEVEL_OUTOF10: 8
PAINLEVEL_OUTOF10: 4
PAINLEVEL_OUTOF10: 0
PAINLEVEL_OUTOF10: 8

## 2022-03-23 ASSESSMENT — PAIN DESCRIPTION - ONSET: ONSET: GRADUAL

## 2022-03-23 ASSESSMENT — PAIN DESCRIPTION - PAIN TYPE
TYPE: CHRONIC PAIN
TYPE: CHRONIC PAIN

## 2022-03-23 ASSESSMENT — PAIN DESCRIPTION - LOCATION
LOCATION: ABDOMEN
LOCATION: ABDOMEN

## 2022-03-23 NOTE — CARE COORDINATION
Pt screened for d/c needs. Pt has PCP, insurance, no DME, no HHC. Case management following for needs.

## 2022-03-23 NOTE — H&P
[address] [place] [address], [address] [place] [address]                              HISTORY AND PHYSICAL    PATIENT NAME: Vero Montejo                     :        1955  MED REC NO:   [id number]                          ROOM:       7343  ACCOUNT NO:   [de-identified]                           ADMIT DATE: 2022  PROVIDER:     Dong Sanchez MD    CHIEF COMPLAINT:  Left lower quadrant abdominal pain and dark stool. HISTORY OF PRESENT ILLNESS:  The patient is a 58-year-old   female who presents to emergency room with about a week of abdominal  pain, diarrhea, nausea. She had come to the office and was diagnosed  with UTI and felt that she might have diverticulitis. She was started  on metronidazole and ciprofloxacin and she was taking these medications. Unfortunately her symptoms began to worsen and she started having black  stools, and was advised to come to the emergency room for further  evaluation and therapy. In the emergency room, CT scan was done which suggested some possible  colitis. She is admitted now for failed outpatient therapy and for  evaluation for possible GI bleeding as she has a history of previous  ulcer. PAST MEDICAL HISTORY:  Includes the following:  Anxiety, bipolar  disorder, previous choking on food, chronic back pain, depression,  diabetes mellitus diagnosed in , history of being a difficult  intubation, fibromyalgia, history of echocardiogram in 2021 and EF of  75-80%. History of stress test which was normal in 2021. Hyperlipidemia, hypertension, panic attacks, previous pneumonia in  , PTSD, shortness of breath and obesity. PAST SURGICAL HISTORY:  Lower back surgery and no hardware, in . Colonic endoscopy in 2017. Previous eye surgery with bilateral  cataracts with lens implants in . Abdominal wall hernia repair in  the .   Vaginal hysterectomy in the 1980s.  Knee surgery on the  right for a benign tumor in the right knee in 2014 and a mole removed  from the right calf in early 2000s which became infected and had to have  more surgeries. Also she had tubes and ovaries removed in 1990s. Rotator cuff repair on the right shoulder in 2014. Upper GI endoscopy  with dilation in 05/2017. SOCIAL HISTORY:  She is an everyday smoker of half a pack a day for 44  years, total pack years 22, she started in 1973. Denies alcohol or drug  use at this time. FAMILY HISTORY:  Noncontributory. ALLERGIES:  Include SULFA ANTIBIOTICS. CURRENT HOME MEDICATIONS:  Include Lotrel 5-10 once a day, gabapentin  400 mg each night, MiraLax p.r.n. Amaryl 2 mg daily breakfast.   Cardiology also added on Prinivil 40 mg once a day, Valium 5 mg every 6  hours as needed for anxiety have been prescribed in the past, omeprazole  10 mg daily, metformin 1000 mg b.i.d., paroxetine 10 mg each evening and  she had been given a short supply of oxycodone to 7.5 mg to use as  needed q. 6 p.r.n. severe pain in the past for her back irritation. PHYSICAL EXAMINATION:  VITAL SIGNS:  In the emergency room blood pressure was 115/90, heart  rate was 90, she was afebrile, respirations 14, O2 sat on room air was  100%. HEENT:  Head is normocephalic, atraumatic. PERRLA. EOMI. Oral mucosa  moist without lesion. NECK:  Supple. There is no TMJ or LA. Oropharynx is clear. LUNGS:  Clear to A and P with good air movement. No wheeze, rales or  rhonchi. HEART:  Shows regular rate and rhythm. No thrills, murmurs, or rubs. Pulses 1-2+ in all extremities. GI:  Abdominal examination shows active bowel sounds. No masses. She  has some tenderness in the left lower quadrant but no rebound or CVAT. No abdominal guarding. EXTREMITIES:  Full range of motion. No acute deformities. SKIN:  Warm and dry. No lesions are noted. There is an old scar of the  lumbar spine area.   Skin is unremarkable. NEUROLOGIC:  She is awake, alert and cooperative to examination. Cranial nerves are grossly intact. Moves all extremities to command. Rapid alternating motions are intact, etc,. LABORATORY VALUES:  Labs include the following:  Sodium 133, potassium  4.2, chloride 93, CO2 is 25, BUN is 6, creatinine is 0.6. Liver function test unremarkable. Troponin T is less than 0.010. Glucose is 380, calcium 9.3, total protein 7.5. Her white count was 10,982, 14 and 47, platelets are 931,085. Differential is 66% segs. Her CT scan shows no acute CT change in the abdomen and pelvis. There  is hepatic steatosis. There is a 5 mm nodule in the right middle lung. DISCUSSION:  The patient comes to hospital with melena and persistent  abdominal pain. She has a history of gastric ulcers. She will be  admitted at this time for hydration, careful following of her blood  count as well as IV antibiotics for _____ treatment of colitis. The  patient was carefully followed for her diabetic status as well as her  sugar is 380. She is on oral medication at home. I think has also been  on a drug called Rybelsus which is a GLP analogue in oral form, not  available through the hospital.  She will be given some insulin coverage  as needed.         Mart Mclean MD    D: 03/23/2022 7:50:17       T: 03/23/2022 7:54:41     [email or web address]  Job#: [id number]     Doc#: [id number]    CC:

## 2022-03-29 NOTE — ED PROVIDER NOTES
Emergency Department Encounter    Patient: Arianna Jacques  MRN: [id number]  : 1955  Date of Evaluation: 3/29/2022  ED Provider:  Marquez Menard MD    Triage Chief Complaint:   Abdominal Pain (diarrhea)    [place]:  Arianna Jacques is a 77 y.o. female presenting with complaints of 1 week of abdominal pain, nausea and diarrhea with new onset of black stools. Patient states she went to her primary doctor on 3/17/2022 and was given Cipro Flagyl has been taking the medications as prescribed. States over the past day she has had black stools and was advised by her PCP come to the ED for evaluation. Patient denies chest pain, shortness of breath, cough or sputum production. States intermittently she will have some lightheadedness. States the abdominal pain is lower abdomen, constant, stabbing, worse with palpation without relieving factors. Denies fevers or chills. States she has nausea without vomiting.     ROS - see HPI, below listed is current ROS at time of my eval:  At least 14 systems reviewed, negative other than HPI    Past Medical History:   Diagnosis Date    Anxiety     Bipolar disorder ([address]     Choking     \"Been Choking On Food For 6 Months\"    Chronic back pain     Depression     Diabetes mellitus ([address] Dx     Difficult intubation     Fibromyalgia     History of echocardiogram 2021    EF 75-80%    History of stress test 03/10/2021    NORMAL STUDY    Hx of cardiovascular stress test 03/10/2021    Normal study    Hyperlipidemia     Hypertension     Panic attacks     Pneumonia Dx 's    PTSD (post-traumatic stress disorder)     Shortness of breath      Past Surgical History:   Procedure Laterality Date    BACK SURGERY  2003    Lower Back, No Hardware    ENDOSCOPY, COLON, DIAGNOSTIC  Last Done 3-17    EYE SURGERY Bilateral     Cataracts With Lens Implants    HERNIA REPAIR  's    Abdominal Hernia Repair    HYSTERECTOMY VAGINAL  's    KNEE SURGERY Right 2014    Benign \"Tumor\" Removed Right Knee    LEG SURGERY Right 2000's    \"Mole Removed From Back Of Right Calf, Became Infected , They Had To Go Back In\"    OTHER SURGICAL HISTORY  1990's    \"Tubes And Ovaries Removed In 1990's\"    ROTATOR CUFF REPAIR Right 2014    UPPER GASTROINTESTINAL ENDOSCOPY  05/11/2017    With Dilation    UPPER GASTROINTESTINAL ENDOSCOPY N/A 3/22/2022    EGD BIOPSY performed by Treva Tapia MD at [place] [id number] reviewed. No pertinent family history. Social History     Socioeconomic History    Marital status:      Spouse name: Not on file    Number of children: Not on file    Years of education: Not on file    Highest education level: Not on file   Occupational History    Not on file   Tobacco Use    Smoking status: Current Every Day Smoker     Packs/day: 0.50     Years: 44.00     Pack years: 22.00     Types: Cigarettes     Start date: 80    Smokeless tobacco: Never Used   Substance and Sexual Activity    Alcohol use: No    Drug use: No    Sexual activity: Never   Other Topics Concern    Not on file   Social History Narrative    Not on file     Social Determinants of Health     Financial Resource Strain:     Difficulty of Paying Living Expenses: Not on file   Food Insecurity:     Worried About Running Out of Food in the Last Year: Not on file    David of Food in the Last Year: Not on file   Transportation Needs:     Lack of Transportation (Medical): Not on file    Lack of Transportation (Non-Medical):  Not on file   Physical Activity:     Days of Exercise per Week: Not on file    Minutes of Exercise per Session: Not on file   Stress:     Feeling of Stress : Not on file   Social Connections:     Frequency of Communication with Friends and Family: Not on file    Frequency of Social Gatherings with Friends and Family: Not on file    Attends Christianity Services: Not on file    Active Member of Clubs or Organizations: Not on file   Sang Hernandez Attends Club or Organization Meetings: Not on file    Marital Status: Not on file   Intimate Partner Violence:     Fear of Current or Ex-Partner: Not on file    Emotionally Abused: Not on file    Physically Abused: Not on file    Sexually Abused: Not on file   Housing Stability:     Unable to Pay for Housing in the Last Year: Not on file    Number of [place] in the Last Year: Not on file    Unstable Housing in the Last Year: Not on file     No current facility-administered medications for this encounter. Current Outpatient Medications   Medication Sig Dispense Refill    amLODIPine-benazepril (LOTREL) 5-10 MG per capsule 1 capsule daily      gabapentin (NEURONTIN) 400 MG capsule Take 400 mg by mouth every evening.  glimepiride (AMARYL) 2 MG tablet Take 2 mg by mouth every morning (before breakfast)      diazepam (VALIUM) 5 MG tablet Take 5 mg by mouth every 6 hours as needed for Anxiety      omeprazole (PRILOSEC) 10 MG delayed release capsule Take 10 mg by mouth daily      metFORMIN (GLUCOPHAGE) 1000 MG tablet Take 1,000 mg by mouth 2 times daily (with meals)      PARoxetine (PAXIL) 10 MG tablet Take 10 mg by mouth every morning.  oxyCODONE-acetaminophen (PERCOCET) 7.5-325 MG per tablet Take 1 tablet by mouth as needed  .  polyethylene glycol (GLYCOLAX) 17 GM/SCOOP powder Take 17 g by mouth daily       Allergies   Allergen Reactions    Sulfa Antibiotics Nausea And Vomiting       Nursing Notes Reviewed    Physical Exam:  Triage VS:    ED Triage Vitals   Enc Vitals Group      BP 03/21/22 1252 (!) 171/84      Pulse 03/21/22 1252 100      Resp 03/21/22 1252 20      Temp 03/21/22 1257 98.3 °F (36.8 °C)      Temp Source 03/21/22 1257 Oral      SpO2 03/21/22 1252 94 %      Weight 03/21/22 1252 170 lb (77.1 kg)      Height 03/21/22 1252 4' 11\" (1.499 m)      Head Circumference --       Peak Flow --       Pain Score --       Pain Loc --       Pain Edu? --       Excl.  in [address]? --        My pulse ox interpretation is - normal    General appearance:  No acute distress. Skin:  Warm. Dry. Eye:  Extraocular movements intact. Ears, nose, mouth and throat:  Oral mucosa moist   Neck:  Trachea midline. Extremity:  No swelling. Normal ROM     Heart:  Regular rate and rhythm, normal S1 & S2, no extra heart sounds. Perfusion:  intact  Respiratory:  Lungs clear to auscultation bilaterally. Respirations nonlabored. Abdominal:  Normal bowel sounds. Soft. Discomfort in the lower abdomen without rebound or guarding, no flank pain, no CVA tenderness, no central spinal tenderness  Back:  No CVA tenderness to palpation     Neurological:  Alert and oriented times 3. No focal neuro deficits.              Psychiatric:  Appropriate    I have reviewed and interpreted all of the currently available lab results from this visit (if applicable):  Results for orders placed or performed during the hospital encounter of 03/21/22   CBC with Auto Differential   Result Value Ref Range    WBC 10.9 (H) 4.0 - 10.5 K/CU MM    RBC 5.51 (H) 4.2 - 5.4 M/CU MM    Hemoglobin 14.7 12.5 - 16.0 GM/DL    Hematocrit 47.5 (H) 37 - 47 %    MCV 86.2 78 - 100 FL    MCH 26.7 (L) 27 - 31 PG    MCHC 30.9 (L) 32.0 - 36.0 %    RDW 13.3 11.7 - 14.9 %    Platelets 622 784 - 634 K/CU MM    MPV 10.4 7.5 - 11.1 FL    Differential Type AUTOMATED DIFFERENTIAL     Segs Relative 66.8 (H) 36 - 66 %    Lymphocytes % 24.3 24 - 44 %    Monocytes % 6.4 (H) 0 - 4 %    Eosinophils % 1.5 0 - 3 %    Basophils % 0.6 0 - 1 %    Segs Absolute 7.3 K/CU MM    Lymphocytes Absolute 2.7 K/CU MM    Monocytes Absolute 0.7 K/CU MM    Eosinophils Absolute 0.2 K/CU MM    Basophils Absolute 0.1 K/CU MM    Nucleated RBC % 0.0 %    Total Nucleated RBC 0.0 K/CU MM    Total Immature Neutrophil 0.04 K/CU MM    Immature Neutrophil % 0.4 0 - 0.43 %   Comprehensive Metabolic Panel w/ Reflex to MG   Result Value Ref Range    Sodium 133 (L) 135 - 145 MMOL/L    Potassium 4.2 3.5 - 5.1 MMOL/L    Chloride 93 (L) 99 - 110 mMol/L    CO2 25 21 - 32 MMOL/L    BUN 6 6 - 23 MG/DL    CREATININE 0.6 0.6 - 1.1 MG/DL    Glucose 380 (H) 70 - 99 MG/DL    Calcium 9.3 8.3 - 10.6 MG/DL    Albumin 4.4 3.4 - 5.0 GM/DL    Total Protein 7.5 6.4 - 8.2 GM/DL    Total Bilirubin 0.2 0.0 - 1.0 MG/DL    ALT 41 (H) 10 - 40 U/L    AST 48 (H) 15 - 37 IU/L    Alkaline Phosphatase 116 40 - 129 IU/L    GFR Non-African American >60 >60 mL/min/1.73m2    GFR African American >60 >60 mL/min/1.73m2    Anion Gap 15 4 - 16   Lipase   Result Value Ref Range    Lipase 47 13 - 60 IU/L   Troponin   Result Value Ref Range    Troponin T <0.010 <0.01 NG/ML   Lactic Acid   Result Value Ref Range    Lactate 1.9 0.4 - 2.0 mMOL/L   Urinalysis with Microscopic   Result Value Ref Range    Color, UA YELLOW YELLOW    Clarity, UA CLEAR CLEAR    Glucose, Urine >1,000 (A) NEGATIVE MG/DL    Bilirubin Urine NEGATIVE NEGATIVE MG/DL    Ketones, Urine NEGATIVE NEGATIVE MG/DL    Specific Gravity, UA >1.030 1.001 - 1.035    Blood, Urine NEGATIVE NEGATIVE    pH, Urine 5.0 5.0 - 8.0    Protein, UA NEGATIVE NEGATIVE MG/DL    Urobilinogen, Urine NORMAL 0.2 - 1.0 MG/DL    Nitrite Urine, Quantitative NEGATIVE NEGATIVE    Leukocyte Esterase, Urine NEGATIVE NEGATIVE    RBC, UA NONE SEEN 0 - 6 /HPF    WBC, UA 1 0 - 5 /HPF    Bacteria, UA OCCASIONAL (A) NEGATIVE /HPF    Squam Epithel, UA 4 /HPF    Trans Epithel, UA <1 /HPF   Protime-INR   Result Value Ref Range    Protime 12.8 11.7 - 14.5 SECONDS    INR 0.99 INDEX   PTT   Result Value Ref Range    aPTT 18.0 (L) 25.1 - 37.1 SECONDS   CBC with Auto Differential   Result Value Ref Range    WBC 9.9 4.0 - 10.5 K/CU MM    RBC 4.95 4.2 - 5.4 M/CU MM    Hemoglobin 13.3 12.5 - 16.0 GM/DL    Hematocrit 41.8 37 - 47 %    MCV 84.4 78 - 100 FL    MCH 26.9 (L) 27 - 31 PG    MCHC 31.8 (L) 32.0 - 36.0 %    RDW 13.4 11.7 - 14.9 %    Platelets 371 123 - 906 K/CU MM    MPV 10.7 7.5 - 11.1 FL    Differential Type AUTOMATED DIFFERENTIAL     Segs Relative 61.1 36 - 66 %    Lymphocytes % 28.3 24 - 44 %    Monocytes % 8.4 (H) 0 - 4 %    Eosinophils % 1.4 0 - 3 %    Basophils % 0.5 0 - 1 %    Segs Absolute 6.1 K/CU MM    Lymphocytes Absolute 2.8 K/CU MM    Monocytes Absolute 0.8 K/CU MM    Eosinophils Absolute 0.1 K/CU MM    Basophils Absolute 0.1 K/CU MM    Nucleated RBC % 0.0 %    Total Nucleated RBC 0.0 K/CU MM    Total Immature Neutrophil 0.03 K/CU MM    Immature Neutrophil % 0.3 0 - 0.43 %   Comprehensive Metabolic Panel w/ Reflex to MG   Result Value Ref Range    Sodium 135 135 - 145 MMOL/L    Potassium 3.7 3.5 - 5.1 MMOL/L    Chloride 97 (L) 99 - 110 mMol/L    CO2 24 21 - 32 MMOL/L    BUN 7 6 - 23 MG/DL    CREATININE 0.5 (L) 0.6 - 1.1 MG/DL    Glucose 313 (H) 70 - 99 MG/DL    Calcium 8.7 8.3 - 10.6 MG/DL    Albumin 4.2 3.4 - 5.0 GM/DL    Total Protein 6.3 (L) 6.4 - 8.2 GM/DL    Total Bilirubin 0.3 0.0 - 1.0 MG/DL    ALT 33 10 - 40 U/L    AST 37 15 - 37 IU/L    Alkaline Phosphatase 96 40 - 128 IU/L    GFR Non-African American >60 >60 mL/min/1.73m2    GFR African American >60 >60 mL/min/1.73m2    Anion Gap 14 4 - 16   Hemoglobin and Hematocrit   Result Value Ref Range    Hemoglobin 12.7 12.5 - 16.0 GM/DL    Hematocrit 40.3 37 - 47 %   POCT glucose   Result Value Ref Range    Glucose 244 mg/dL    QC OK? y    POCT Glucose   Result Value Ref Range    POC Glucose 244 (H) 70 - 99 MG/DL   POCT Glucose   Result Value Ref Range    POC Glucose 270 (H) 70 - 99 MG/DL   POCT Glucose   Result Value Ref Range    POC Glucose 235 (H) 70 - 99 MG/DL   POCT Glucose   Result Value Ref Range    POC Glucose 263 (H) 70 - 99 MG/DL   POCT Glucose   Result Value Ref Range    POC Glucose 317 (H) 70 - 99 MG/DL   POCT Glucose   Result Value Ref Range    POC Glucose 293 (H) 70 - 99 MG/DL   POCT Glucose   Result Value Ref Range    POC Glucose 194 (H) 70 - 99 MG/DL   EKG 12 Lead   Result Value Ref Range    Ventricular Rate 95 BPM    Atrial Rate 95 BPM    P-R Interval 146 ms    QRS Duration 72 ms    Q-T Interval 380 ms    QTc Calculation (Bazett) 477 ms    P Axis 20 degrees    R Axis -38 degrees    T Axis 48 degrees    Diagnosis       Normal sinus rhythm  Left axis deviation  Anterolateral infarct (cited on or before 21-FEB-2021)  Abnormal ECG  When compared with ECG of 21-FEB-2021 22:05,  No significant change was found  Confirmed by Елена Kitchen ([id number]) on 3/21/2022 5:03:58 PM     EKG 12 lead   Result Value Ref Range    Ventricular Rate 73 BPM    Atrial Rate 73 BPM    P-R Interval 142 ms    QRS Duration 70 ms    Q-T Interval 428 ms    QTc Calculation (Bazett) 471 ms    P Axis 22 degrees    R Axis -24 degrees    T Axis 47 degrees    Diagnosis       Normal sinus rhythm  Cannot rule out Anterior infarct (cited on or before 21-FEB-2021)  Abnormal ECG  When compared with ECG of 21-MAR-2022 13:04,  No significant change was found  Confirmed by Елена Kitchen ([id number]) on 3/23/2022 4:29:43 PM     TYPE AND SCREEN   Result Value Ref Range    ABO/Rh A2 POSITIVE     Antibody Screen NEGATIVE     Antibody Data ANTI-A1 PRESENT     Antigen Data A1 NEGATIVE     Comment       PEG YIELDS NEG SCREEN THEREFORE RECOMMEND SAME FOR FUTURE COMPATIBILITY      Radiographs (if obtained):  Radiologist's Report Reviewed:  CT ABDOMEN PELVIS W IV CONTRAST Additional Contrast? None    Result Date: 3/21/2022  EXAMINATION: CT OF THE ABDOMEN AND PELVIS WITH CONTRAST 3/21/2022 3:25 pm TECHNIQUE: CT of the abdomen and pelvis was performed with the administration of intravenous contrast. Multiplanar reformatted images are provided for review. Dose modulation, iterative reconstruction, and/or weight based adjustment of the mA/kV was utilized to reduce the radiation dose to as low as reasonably achievable. COMPARISON: None.  HISTORY: ORDERING SYSTEM PROVIDED HISTORY: abdominal pain TECHNOLOGIST PROVIDED HISTORY: Additional Contrast?->None Reason for exam:->abdominal pain Decision Support Exception - unselect if not a suspected or confirmed emergency medical condition->Emergency Medical Condition (MA) Reason for Exam: Abdominal Pain Relevant Medical/Surgical History: 75 ml isovue 370   lot [id number] FINDINGS: Lower Chest: Centrilobular emphysema. Calcified granulomas in the right middle lobe. 5 mm nodule in the right middle lobe at the lung base. No pleural effusion. Organs: Diffusely decreased attenuation of the hepatic parenchyma. No focal lesion. .  Gallbladder is unremarkable. Left adrenal nodule is unchanged dating to 2013 and likely represents an adenoma. No acute abnormality of the spleen or pancreas. No biliary ductal dilation. Kidneys enhance symmetrically. No hydroureteronephrosis. GI/Bowel: No bowel obstruction. Normal appendix in the right lower quadrant. Colonic diverticulosis without CT evidence of acute diverticulitis. Pelvis: Decompressed bladder, limiting detailed evaluation. Status post hysterectomy. No adnexal mass. Peritoneum/Retroperitoneum: Abdominal aorta is normal in course and caliber. The portal vein is patent. No lymphadenopathy. No ascites or free intraperitoneal air. Bones/Soft Tissues: No acute soft tissue abnormality. No acute osseous abnormality or suspicious osseous lesion. No acute CT abnormality in the abdomen or pelvis. Hepatic steatosis. 5 mm nodule in the right middle lobe. No specific imaging follow-up is necessary. RECOMMENDATIONS: 5 mm right solid pulmonary nodule. No routine follow-up imaging is recommended per Fleischner Society Guidelines. These guidelines do not apply to immunocompromised patients and patients with cancer. Follow up in patients with significant comorbidities as clinically warranted. For lung cancer screening, adhere to Lung-RADS guidelines. Reference: Radiology. 2017; 284(1):228-43.        EKG (if obtained): (All EKG's are interpreted by myself in the absence of a cardiologist)  Normal sinus rhythm, ventricular rate 95, IA interval 146, QRS duration 72, QTc 477, left axis deviation, no significant ST elevation or depression    MDM:    60-year-old female presenting with abdominal pain, nausea, diarrhea and melena. History and be seen above. Vitals on presentation patient is tachycardic at 100. Otherwise vitals are reassuring patient afebrile satting well on room air. On exam patient does have some diffuse discomfort in the lower abdomen without rebound or guarding. Lungs are clear to auscultation, cardiac exam is reassuring, neuro exam is nonfocal.  CBC reveals a mild leukocytosis of 10.9. Hemoglobin is 14.7 with most recent hemoglobin on 1/14/2022 of 15.0. Patient states the melena just began today. Patient is hyperglycemic at 380. Patient has a mildly elevated AST and ALT of 48 and 41 respectively. Lipase is within normal limits. Bili and alk phos are within normal limits. EKG is nonacute and [place] Fairy is within normal limits. Lactate is nonelevated. UA not consistent with infection. CT abdomen pelvis shows no acute CT abnormality of the abdomen pelvis. There is a 5 mm nodule the right middle lobe as well as hepatic steatosis. I did discuss with Dr. Amanda Murcia of GI. Discussion with patient GI will admit patient PCP for further evaluation and treatment GI evaluation the morning. Clinical Impression:  1. Generalized abdominal pain    2. Melena    3. Hyperglycemia        Comment: Please note this report has been produced using speech recognition software and may contain errors related to that system including errors in grammar, punctuation, and spelling, as well as words and phrases that may be inappropriate. Efforts were made to edit the dictations.        Swati Garcia MD  03/29/22 [id number]

## 2022-04-05 ENCOUNTER — OFFICE VISIT (OUTPATIENT)
Dept: GASTROENTEROLOGY | Age: 67
End: 2022-04-05
Payer: MEDICARE

## 2022-04-05 VITALS
OXYGEN SATURATION: 93 % | SYSTOLIC BLOOD PRESSURE: 144 MMHG | DIASTOLIC BLOOD PRESSURE: 64 MMHG | WEIGHT: 161.2 LBS | HEIGHT: 59 IN | HEART RATE: 89 BPM | TEMPERATURE: 97.2 F | BODY MASS INDEX: 32.5 KG/M2

## 2022-04-05 DIAGNOSIS — K92.1 MELENA: Primary | ICD-10-CM

## 2022-04-05 DIAGNOSIS — R10.32 LEFT LOWER QUADRANT ABDOMINAL PAIN: ICD-10-CM

## 2022-04-05 DIAGNOSIS — R19.7 DIARRHEA, UNSPECIFIED TYPE: ICD-10-CM

## 2022-04-05 DIAGNOSIS — K21.9 GASTROESOPHAGEAL REFLUX DISEASE WITHOUT ESOPHAGITIS: ICD-10-CM

## 2022-04-05 PROCEDURE — G8399 PT W/DXA RESULTS DOCUMENT: HCPCS | Performed by: NURSE PRACTITIONER

## 2022-04-05 PROCEDURE — 4004F PT TOBACCO SCREEN RCVD TLK: CPT | Performed by: NURSE PRACTITIONER

## 2022-04-05 PROCEDURE — 1090F PRES/ABSN URINE INCON ASSESS: CPT | Performed by: NURSE PRACTITIONER

## 2022-04-05 PROCEDURE — 99215 OFFICE O/P EST HI 40 MIN: CPT | Performed by: NURSE PRACTITIONER

## 2022-04-05 PROCEDURE — 4040F PNEUMOC VAC/ADMIN/RCVD: CPT | Performed by: NURSE PRACTITIONER

## 2022-04-05 PROCEDURE — G8427 DOCREV CUR MEDS BY ELIG CLIN: HCPCS | Performed by: NURSE PRACTITIONER

## 2022-04-05 PROCEDURE — 3017F COLORECTAL CA SCREEN DOC REV: CPT | Performed by: NURSE PRACTITIONER

## 2022-04-05 PROCEDURE — G8417 CALC BMI ABV UP PARAM F/U: HCPCS | Performed by: NURSE PRACTITIONER

## 2022-04-05 PROCEDURE — 1111F DSCHRG MED/CURRENT MED MERGE: CPT | Performed by: NURSE PRACTITIONER

## 2022-04-05 PROCEDURE — 1123F ACP DISCUSS/DSCN MKR DOCD: CPT | Performed by: NURSE PRACTITIONER

## 2022-04-05 RX ORDER — PANTOPRAZOLE SODIUM 40 MG/1
40 TABLET, DELAYED RELEASE ORAL
Qty: 90 TABLET | Refills: 3 | Status: SHIPPED | OUTPATIENT
Start: 2022-04-05

## 2022-04-05 RX ORDER — POLYETHYLENE GLYCOL 3350 17 G/17G
238 POWDER, FOR SOLUTION ORAL ONCE
Qty: 238 G | Refills: 0 | Status: SHIPPED | OUTPATIENT
Start: 2022-04-05 | End: 2022-04-05

## 2022-04-05 RX ORDER — BISACODYL 5 MG
TABLET, DELAYED RELEASE (ENTERIC COATED) ORAL
Qty: 4 TABLET | Refills: 0 | Status: SHIPPED | OUTPATIENT
Start: 2022-04-05

## 2022-04-05 ASSESSMENT — ENCOUNTER SYMPTOMS
PHOTOPHOBIA: 0
BLOOD IN STOOL: 0
COUGH: 0
COLOR CHANGE: 0
NAUSEA: 0
SHORTNESS OF BREATH: 0
EYE PAIN: 0
ABDOMINAL PAIN: 0
VOMITING: 0
DIARRHEA: 1
CONSTIPATION: 0
BACK PAIN: 1
WHEEZING: 0

## 2022-04-05 NOTE — PROGRESS NOTES
Jose C Wyatt 77 y.o. female was seen by TAE Wiley on 4/5/2022     Wt Readings from Last 3 Encounters:   04/05/22 161 lb 3.2 oz (73.1 kg)   03/21/22 170 lb (77.1 kg)   03/26/21 172 lb 6.4 oz (78.2 kg)       YVETTE Stephens is a pleasant 77 y.o.  female who presents today for follow-up on abdominal pain and melena. She denies NSAID use. She has a past medical history of anxiety, bipolar disorder, choking, chronic back pain, depression, diabetes mellitus, difficult intubation, fibromyalgia, history of echocardiogram, history of stress test, hx of cardiovascular stress test, hyperlipidemia, hypertension, panic attacks, pneumonia, PTSD (post-traumatic stress disorder), and shortness of breath. Her last colonoscopy was done by Dr. Lois Mary ten years ago with uncertain results. She was recently admitted for three days at [facility] for abdominal pain, diarrhea, nausea and melena. Her EGD was done by Dr. Stefani Glover on 3- showing 2 cm hiatal hernia, distal esophagitis and gastritis. Her stomach biopsies showed oxyntic gastric mucosa with no H. Pylori infection. Her lab work done on 3- showed Hgb 12.7 and Hct 40.3. Her appetite is fair with early satiety. Her weight is down ten pounds. Intermittent nausea. No vomiting. Her heartburn and acid reflux is controlled with taking Protonix daily. No nocturnal awakenings with acid reflux. No dysphagia or pain with swallowing. Her CT of the abdomen/pelvis done on 3- showed:   No acute CT abnormality in the abdomen or pelvis.       Hepatic steatosis.     5 mm nodule in the right middle lobe.  No specific imaging follow-up is   necessary. She has intermittent left sided abdominal pain described as a dull ache. Her pain improves with laying on her left side. No excess belching or flatulence. She has chronic diarrhea. Her typical bowel pattern is three to four times daily with dark liquid stools.   She mentioned noting black stools at end of March. Her last episode of black stool was a small amount this morning. No obvious blood in her stool. No family history of stomach or colon cancer. She does have a family history of aplastic anemia. ROS  Review of Systems   Constitutional: Positive for fatigue. Negative for appetite change, chills, diaphoresis, fever and unexpected weight change. HENT: Negative for ear pain, hearing loss and tinnitus. Eyes: Negative for photophobia, pain and visual disturbance. Respiratory: Negative for cough, shortness of breath and wheezing. Cardiovascular: Negative for chest pain, palpitations and leg swelling. Gastrointestinal: Positive for diarrhea. Negative for abdominal pain, blood in stool, constipation, nausea and vomiting. Endocrine: Negative for cold intolerance, heat intolerance and polydipsia. Genitourinary: Negative for dysuria, frequency and urgency. Musculoskeletal: Positive for back pain and myalgias. Negative for neck pain. Skin: Negative for color change, pallor and rash. Allergic/Immunologic: Negative for environmental allergies and food allergies. Neurological: Negative for dizziness, seizures, weakness and headaches. Hematological: Does not bruise/bleed easily. Psychiatric/Behavioral: Positive for dysphoric mood and sleep disturbance. Negative for suicidal ideas. The patient is nervous/anxious. Allergies  Allergies   Allergen Reactions    Sulfa Antibiotics Nausea And Vomiting       Medications  Current Outpatient Medications   Medication Sig Dispense Refill    amLODIPine-benazepril (LOTREL) 5-10 MG per capsule 1 capsule daily      gabapentin (NEURONTIN) 400 MG capsule Take 400 mg by mouth every evening.        polyethylene glycol (GLYCOLAX) 17 GM/SCOOP powder Take 17 g by mouth daily      glimepiride (AMARYL) 2 MG tablet Take 2 mg by mouth every morning (before breakfast)      diazepam (VALIUM) 5 MG tablet Take 5 mg by mouth every 6 hours as needed for Anxiety      omeprazole (PRILOSEC) 10 MG delayed release capsule Take 10 mg by mouth daily      metFORMIN (GLUCOPHAGE) 1000 MG tablet Take 1,000 mg by mouth 2 times daily (with meals)      PARoxetine (PAXIL) 10 MG tablet Take 10 mg by mouth every morning.  oxyCODONE-acetaminophen (PERCOCET) 7.5-325 MG per tablet Take 1 tablet by mouth as needed  . No current facility-administered medications for this visit. Past medical history:   She has a past medical history of Anxiety, Bipolar disorder ([address] Choking, Chronic back pain, Depression, Diabetes mellitus ([facility] [address] Difficult intubation, Fibromyalgia, History of echocardiogram, History of stress test, Hx of cardiovascular stress test, Hyperlipidemia, Hypertension, Panic attacks, Pneumonia, PTSD (post-traumatic stress disorder), and Shortness of breath. Past surgical history:  She has a past surgical history that includes eye surgery (Bilateral, 2000's); back surgery (2003); HYSTERECTOMY VAGINAL (1980's); other surgical history (1990's); hernia repair (1970's); Endoscopy, colon, diagnostic (Last Done 3-17); Rotator cuff repair (Right, 2014); knee surgery (Right, 2014); Leg Surgery (Right, 2000's); Upper gastrointestinal endoscopy (05/11/2017); and Upper gastrointestinal endoscopy (N/A, 3/22/2022). Social History:  She reports that she has been smoking cigarettes. She started smoking about 49 years ago. She has a 22.00 pack-year smoking history. She has never used smokeless tobacco. She reports that she does not drink alcohol and does not use drugs. Family history:  Her family history is not on file. Objective    Vitals:    04/05/22 1539   BP: (!) 144/64   Pulse: 89   Temp: 97.2 °F (36.2 °C)   SpO2: 93%        Physical exam    Physical Exam  Vitals reviewed. Constitutional:       General: She is not in acute distress. Appearance: She is well-developed. She is obese.  She is not ill-appearing, toxic-appearing or diaphoretic. HENT:      Head: Normocephalic and atraumatic. Nose: Nose normal.      Mouth/Throat:      Mouth: Mucous membranes are moist.   Eyes:      Conjunctiva/sclera: Conjunctivae normal.      Pupils: Pupils are equal, round, and reactive to light. Neck:      Thyroid: No thyromegaly. Vascular: No JVD. Trachea: No tracheal deviation. Cardiovascular:      Rate and Rhythm: Normal rate and regular rhythm. Pulses: Normal pulses. Heart sounds: Normal heart sounds. No murmur heard. No friction rub. No gallop. Pulmonary:      Effort: Pulmonary effort is normal. No respiratory distress. Breath sounds: Normal breath sounds. No stridor. No wheezing, rhonchi or rales. Chest:      Chest wall: No tenderness. Abdominal:      General: Bowel sounds are normal. There is no distension. Palpations: Abdomen is soft. There is no mass. Tenderness: There is abdominal tenderness (mild left lower quadrant). There is no guarding or rebound. Hernia: No hernia is present. Musculoskeletal:         General: Normal range of motion. Cervical back: Normal range of motion and neck supple. Lymphadenopathy:      Cervical: No cervical adenopathy. Skin:     General: Skin is warm and dry. Neurological:      Mental Status: She is alert and oriented to person, place, and time.    Psychiatric:         Mood and Affect: Mood normal.         Admission on 03/21/2022, Discharged on 03/23/2022   Component Date Value Ref Range Status    WBC 03/21/2022 10.9* 4.0 - 10.5 K/CU MM Final    RBC 03/21/2022 5.51* 4.2 - 5.4 M/CU MM Final    Hemoglobin 03/21/2022 14.7  12.5 - 16.0 GM/DL Final    Hematocrit 03/21/2022 47.5* 37 - 47 % Final    MCV 03/21/2022 86.2  78 - 100 FL Final    MCH 03/21/2022 26.7* 27 - 31 PG Final    MCHC 03/21/2022 30.9* 32.0 - 36.0 % Final    RDW 03/21/2022 13.3  11.7 - 14.9 % Final    Platelets 04/16/3815 269  140 - 440 K/CU MM Final    MPV 03/21/2022 10.4  7.5 - 11.1 FL Final    Differential Type 03/21/2022 AUTOMATED DIFFERENTIAL   Final    Segs Relative 03/21/2022 66.8* 36 - 66 % Final    Lymphocytes % 03/21/2022 24.3  24 - 44 % Final    Monocytes % 03/21/2022 6.4* 0 - 4 % Final    Eosinophils % 03/21/2022 1.5  0 - 3 % Final    Basophils % 03/21/2022 0.6  0 - 1 % Final    Segs Absolute 03/21/2022 7.3  K/CU MM Final    Lymphocytes Absolute 03/21/2022 2.7  K/CU MM Final    Monocytes Absolute 03/21/2022 0.7  K/CU MM Final    Eosinophils Absolute 03/21/2022 0.2  K/CU MM Final    Basophils Absolute 03/21/2022 0.1  K/CU MM Final    Nucleated RBC % 03/21/2022 0.0  % Final    Total Nucleated RBC 03/21/2022 0.0  K/CU MM Final    Total Immature Neutrophil 03/21/2022 0.04  K/CU MM Final    Immature Neutrophil % 03/21/2022 0.4  0 - 0.43 % Final    Sodium 03/21/2022 133* 135 - 145 MMOL/L Final    Potassium 03/21/2022 4.2  3.5 - 5.1 MMOL/L Final    Chloride 03/21/2022 93* 99 - 110 mMol/L Final    CO2 03/21/2022 25  21 - 32 MMOL/L Final    BUN 03/21/2022 6  6 - 23 MG/DL Final    CREATININE 03/21/2022 0.6  0.6 - 1.1 MG/DL Final    Glucose 03/21/2022 380* 70 - 99 MG/DL Final    Calcium 03/21/2022 9.3  8.3 - 10.6 MG/DL Final    Albumin 03/21/2022 4.4  3.4 - 5.0 GM/DL Final    Total Protein 03/21/2022 7.5  6.4 - 8.2 GM/DL Final    Total Bilirubin 03/21/2022 0.2  0.0 - 1.0 MG/DL Final    ALT 03/21/2022 41* 10 - 40 U/L Final    AST 03/21/2022 48* 15 - 37 IU/L Final    Alkaline Phosphatase 03/21/2022 116  40 - 129 IU/L Final    GFR Non- 03/21/2022 >60  >60 mL/min/1.73m2 Final    GFR  03/21/2022 >60  >60 mL/min/1.73m2 Final    Anion Gap 03/21/2022 15  4 - 16 Final    Lipase 03/21/2022 47  13 - 60 IU/L Final    Troponin T 03/21/2022 <0.010  <0.01 NG/ML Final    Comment:         Patients with high levels of Biotin oral intake  (ie >5 mg/day) may have falsely decreased Troponin  levels.  Samples collected within 8 hours of Biotin  intake may require addtional information for diagnosis.       Lactate 03/21/2022 1.9  0.4 - 2.0 mMOL/L Final    Ventricular Rate 03/21/2022 95  BPM Final    Atrial Rate 03/21/2022 95  BPM Final    P-R Interval 03/21/2022 146  ms Final    QRS Duration 03/21/2022 72  ms Final    Q-T Interval 03/21/2022 380  ms Final    QTc Calculation (Bazett) 03/21/2022 477  ms Final    P Axis 03/21/2022 20  degrees Final    R Axis 03/21/2022 -38  degrees Final    T Axis 03/21/2022 48  degrees Final    Diagnosis 03/21/2022    Final                    Value:Normal sinus rhythm  Left axis deviation  Anterolateral infarct (cited on or before 21-FEB-2021)  Abnormal ECG  When compared with ECG of 21-FEB-2021 22:05,  No significant change was found  Confirmed by Ajay Ritchie ([id number]) on 3/21/2022 5:03:58 PM      Color, UA 03/21/2022 YELLOW  YELLOW Final    Clarity, UA 03/21/2022 CLEAR  CLEAR Final    Glucose, Urine 03/21/2022 >1,000* NEGATIVE MG/DL Final    Bilirubin Urine 03/21/2022 NEGATIVE  NEGATIVE MG/DL Final    Ketones, Urine 03/21/2022 NEGATIVE  NEGATIVE MG/DL Final    Specific [place], UA 03/21/2022 >1.030  1.001 - 1.035 Final    Comment: (NOTE)  CONSIDER URINE OSMOLARITY TEST IF CLINICALLY INDICATED        Blood, Urine 03/21/2022 NEGATIVE  NEGATIVE Final    pH, Urine 03/21/2022 5.0  5.0 - 8.0 Final    Protein, UA 03/21/2022 NEGATIVE  NEGATIVE MG/DL Final    Urobilinogen, Urine 03/21/2022 NORMAL  0.2 - 1.0 MG/DL Final    Nitrite Urine, Quantitative 03/21/2022 NEGATIVE  NEGATIVE Final    Leukocyte Esterase, Urine 03/21/2022 NEGATIVE  NEGATIVE Final    RBC, UA 03/21/2022 NONE SEEN  0 - 6 /HPF Final    WBC, UA 03/21/2022 1  0 - 5 /HPF Final    Bacteria, UA 03/21/2022 OCCASIONAL* NEGATIVE /HPF Final    Squam Epithel, UA 03/21/2022 4  /HPF Final    Trans Epithel, UA 03/21/2022 <1  /HPF Final    Protime 03/21/2022 12.8  11.7 - 14.5 SECONDS Final    Comment: Protime seconds can vary  due to reagent sensitivity. Please use INR to monitor  oral anticoagulants.  INR 03/21/2022 0.99  INDEX Final    Comment: THERAPEUTIC RANGE:  INDICATIONS: INR 2.0-3.0  Most (DVT, PE,  Atrial Fibillation, Bioprosthetic valve,   [place] bicuspid aortic valve)  INDICATIONS: 2.5-3.5 Most mechanical valves  recurrent thrombosis.  aPTT 03/21/2022 18.0* 25.1 - 37.1 SECONDS Final    Comment: EFFECTIVE 07/07/2017  THERAPEUTIC RANGE FOR ROUTINE  HEPARIN USE IS 52.0-85.0 SECONDS.       ABO/Rh 03/21/2022 A2 POSITIVE   Final    Antibody Screen 03/21/2022 NEGATIVE   Final    Antibody Data 03/21/2022 ANTI-A1 PRESENT   Final    Antigen Data 03/21/2022 A1 NEGATIVE   Final    Comment 03/21/2022 PEG YIELDS NEG SCREEN THEREFORE RECOMMEND SAME FOR FUTURE COMPATIBILITY   Final    Glucose 03/21/2022 244  mg/dL Final    QC OK? 03/21/2022 y   Final    POC Glucose 03/21/2022 244* 70 - 99 MG/DL Final    Ventricular Rate 03/22/2022 73  BPM Final    Atrial Rate 03/22/2022 73  BPM Final    P-R Interval 03/22/2022 142  ms Final    QRS Duration 03/22/2022 70  ms Final    Q-T Interval 03/22/2022 428  ms Final    QTc Calculation (Bazett) 03/22/2022 471  ms Final    P Axis 03/22/2022 22  degrees Final    R Axis 03/22/2022 -24  degrees Final    T Axis 03/22/2022 47  degrees Final    Diagnosis 03/22/2022    Final                    Value:Normal sinus rhythm  Cannot rule out Anterior infarct (cited on or before 21-FEB-2021)  Abnormal ECG  When compared with ECG of 21-MAR-2022 13:04,  No significant change was found  Confirmed by Y'all  ([id number]) on 3/23/2022 4:29:43 PM      WBC 03/22/2022 9.9  4.0 - 10.5 K/CU MM Final    RBC 03/22/2022 4.95  4.2 - 5.4 M/CU MM Final    Hemoglobin 03/22/2022 13.3  12.5 - 16.0 GM/DL Final    Hematocrit 03/22/2022 41.8  37 - 47 % Final    MCV 03/22/2022 84.4  78 - 100 FL Final    MCH 03/22/2022 26.9* 27 - 31 PG Final    MCHC 03/22/2022 31.8* 32.0 - 36.0 % Final    RDW 03/22/2022 13.4  11.7 - 14.9 % Final    Platelets 99/96/8999 227  140 - 440 K/CU MM Final    MPV 03/22/2022 10.7  7.5 - 11.1 FL Final    Differential Type 03/22/2022 AUTOMATED DIFFERENTIAL   Final    Segs Relative 03/22/2022 61.1  36 - 66 % Final    Lymphocytes % 03/22/2022 28.3  24 - 44 % Final    Monocytes % 03/22/2022 8.4* 0 - 4 % Final    Eosinophils % 03/22/2022 1.4  0 - 3 % Final    Basophils % 03/22/2022 0.5  0 - 1 % Final    Segs Absolute 03/22/2022 6.1  K/CU MM Final    Lymphocytes Absolute 03/22/2022 2.8  K/CU MM Final    Monocytes Absolute 03/22/2022 0.8  K/CU MM Final    Eosinophils Absolute 03/22/2022 0.1  K/CU MM Final    Basophils Absolute 03/22/2022 0.1  K/CU MM Final    Nucleated RBC % 03/22/2022 0.0  % Final    Total Nucleated RBC 03/22/2022 0.0  K/CU MM Final    Total Immature Neutrophil 03/22/2022 0.03  K/CU MM Final    Immature Neutrophil % 03/22/2022 0.3  0 - 0.43 % Final    Sodium 03/22/2022 135  135 - 145 MMOL/L Final    Potassium 03/22/2022 3.7  3.5 - 5.1 MMOL/L Final    Chloride 03/22/2022 97* 99 - 110 mMol/L Final    CO2 03/22/2022 24  21 - 32 MMOL/L Final    BUN 03/22/2022 7  6 - 23 MG/DL Final    CREATININE 03/22/2022 0.5* 0.6 - 1.1 MG/DL Final    Glucose 03/22/2022 313* 70 - 99 MG/DL Final    Calcium 03/22/2022 8.7  8.3 - 10.6 MG/DL Final    Albumin 03/22/2022 4.2  3.4 - 5.0 GM/DL Final    Total Protein 03/22/2022 6.3* 6.4 - 8.2 GM/DL Final    Total Bilirubin 03/22/2022 0.3  0.0 - 1.0 MG/DL Final    ALT 03/22/2022 33  10 - 40 U/L Final    AST 03/22/2022 37  15 - 37 IU/L Final    Alkaline Phosphatase 03/22/2022 96  40 - 128 IU/L Final    GFR Non- 03/22/2022 >60  >60 mL/min/1.73m2 Final    GFR  03/22/2022 >60  >60 mL/min/1.73m2 Final    Anion Gap 03/22/2022 14  4 - 16 Final    POC Glucose 03/22/2022 270* 70 - 99 MG/DL Final    POC Glucose 03/22/2022 235* 70 - 99 MG/DL Final    POC Glucose 03/22/2022 263* 70 - 99 MG/DL Final    Hemoglobin 03/23/2022 12.7  12.5 - 16.0 GM/DL Final    Hematocrit 03/23/2022 40.3  37 - 47 % Final    POC Glucose 03/22/2022 317* 70 - 99 MG/DL Final    POC Glucose 03/23/2022 293* 70 - 99 MG/DL Final    POC Glucose 03/23/2022 194* 70 - 99 MG/DL Final       Assessment and Plan:  1. Will plan for a colonoscopy with MAC anesthesia. The patient was informed of the risks and benefits of the procedure. 2.  Melena will order colonoscopy to rule out GI source for bleeding. Will order CBC and stool for occult blood. Her EGD showed no evidence of GI bleeding. Recommend avoidance of NSAID's, improved glycemic control, smoking cessation and continue taking Protonix daily. 3.  Diarrhea most likely medication related or IBS- will order stool studies to rule out infection vs. Inflammation. The patient was encouraged to increase her fruit, fiber and fluids. 4. Intermittent left lower quadrant abdominal pain- recent CT showed no evidence of abnormalities. May benefit from using Bentyl as needed. 5.  GERD that is stable without dysphagia. The patient was encouraged to continue taking Protonix daily for treatment of acid reflux. The patient was encouraged to continue with anti-reflux measures and avoid foods that trigger. 6.  Further recommendations for follow-up will be determined after the colonoscopy has been completed.

## 2022-04-05 NOTE — PATIENT INSTRUCTIONS
Patient Education        Colonoscopy: Before Your Procedure  What is a colonoscopy? A colonoscopy is a test that lets a doctor look inside your colon. The doctor uses a thin, lighted tube called a colonoscope to look for problems. Theseinclude small growths called polyps, cancer, or bleeding. During the test, the doctor can take samples of tissue that can be checked for cancer or other problems. This is called a biopsy. The doctor can also take outpolyps. Before the test, you will need to stop eating solid foods. You also will be given instructions on how to clean out your colon. This helps your doctor beable to see inside your colon during the test.  How do you prepare for the procedure? Procedures can be stressful. This information will help you understand what youcan expect. And it will help you safely prepare for your procedure. Preparing for the procedure     Be sure you have someone to take you home. Anesthesia and pain medicine will make it unsafe for you to drive or get home on your own.  Understand exactly what procedure is planned, along with the risks, benefits, and other options.      Tell your doctor ALL the medicines, vitamins, supplements, and herbal remedies you take. Some may increase the risk of problems during your procedure. Your doctor will tell you if you should stop taking any of them before the procedure and how soon to do it.      If you take aspirin or some other blood thinner, ask your doctor if you should stop taking it before your procedure. Make sure that you understand exactly what your doctor wants you to do. These medicines increase the risk of bleeding.      Make sure your doctor and the hospital have a copy of your advance directive. If you don't have one, you may want to prepare one. It lets others know your health care wishes. It's a good thing to have before any type of surgery or procedure.    Before the procedure     Follow your doctor's directions about when to stop eating solid foods and drink only clear liquids. You can drink water, clear juices, clear broths, flavored ice pops, and gelatin (such as Jell-O). Do not eat or drink anything red or purple. This includes grape juice and grape-flavored ice pops. It also includes fruit punch and cherry gelatin.      Drink the \"colon prep\" liquid as your doctor tells you. You will want to stay home, because the liquid will make you go to the bathroom a lot. Your stools will be loose and watery. It's very important to drink all of the liquid. If you have problems drinking it, call your doctor.      Do not eat any solid foods after you drink the colon prep.      Stop drinking clear liquids for a few hours before the test. Your doctor will tell you how many hours this will be. What happens on the day of the procedure?  Follow the instructions exactly about when to stop eating and drinking. If you don't, your procedure may be canceled. If your doctor told you to take your medicines on the day of the procedure, take them with only a sip of water.      Take a bath or shower before you come in for your procedure. Do not apply lotions, perfumes, deodorants, or nail polish.      Take off all jewelry and piercings. And take out contact lenses, if you wear them. At the doctor's office or hospital    Bring a picture ID.      You will be kept comfortable and safe by your anesthesia provider. The anesthesia may make you sleep.      You will lie on your back or your side with your knees drawn up toward your belly. The doctor will gently put a gloved finger into your anus. Then the doctor puts the scope in and moves it into your colon. The scope goes in easily because it is lubricated.      The doctor may also use small tools to take tissue samples for a biopsy or to remove polyps. This does not hurt.      The test usually takes 30 to 45 minutes. But it may take longer. It depends on what is found and what is done.    When should you call your doctor?  You have questions or concerns.      You don't understand how to prepare for your procedure.      You are having trouble with the bowel prep.      You become ill before the procedure (such as fever, flu, or a cold).      You need to reschedule or have changed your mind about having the procedure. Where can you learn more? Go to [email or web address] and sign in to your [employer] account. Enter C315 in the [employer] box to learn more about \"Colonoscopy: Before Your Procedure. \"     If you do not have an account, please click on the \"Sign Up Now\" link. Current as of: September 8, 2021               Content Version: 13.2  © 2006-2022 [employer]. Care instructions adapted under license by [facility] [place] ([facility]). If you have questions about a medical condition or this instruction, always ask your healthcare professional. [address] any warranty or liability for your use of this information.

## 2022-04-06 ENCOUNTER — PREP FOR PROCEDURE (OUTPATIENT)
Dept: GASTROENTEROLOGY | Age: 67
End: 2022-04-06

## 2022-04-06 RX ORDER — SODIUM CHLORIDE 0.9 % (FLUSH) 0.9 %
5-40 SYRINGE (ML) INJECTION PRN
Status: CANCELLED | OUTPATIENT
Start: 2022-04-06

## 2022-04-06 RX ORDER — SODIUM CHLORIDE 0.9 % (FLUSH) 0.9 %
5-40 SYRINGE (ML) INJECTION EVERY 12 HOURS SCHEDULED
Status: CANCELLED | OUTPATIENT
Start: 2022-04-06

## 2022-04-06 RX ORDER — SODIUM CHLORIDE, SODIUM LACTATE, POTASSIUM CHLORIDE, CALCIUM CHLORIDE 600; 310; 30; 20 MG/100ML; MG/100ML; MG/100ML; MG/100ML
INJECTION, SOLUTION INTRAVENOUS CONTINUOUS
Status: CANCELLED | OUTPATIENT
Start: 2022-04-06

## 2022-04-06 RX ORDER — SODIUM CHLORIDE 9 MG/ML
25 INJECTION, SOLUTION INTRAVENOUS PRN
Status: CANCELLED | OUTPATIENT
Start: 2022-04-06

## 2022-05-20 NOTE — DISCHARGE SUMMARY
[address], [facility]                               DISCHARGE SUMMARY    PATIENT NAME: Zaheer Frederick                     :        1955  MED REC NO:   [id number]                          ROOM:       [id number]  ACCOUNT NO:   [de-identified]                           ADMIT DATE: 2022  PROVIDER:     Sharilyn Saint, MD                  DISCHARGE DATE:  2022    DISCHARGE DIAGNOSES:  Include the followin. Melena. 2.  Left lower quadrant abdominal pain. 3.  Gastritis without bleeding. 4.  Type 2 diabetes. 5.  Primary hypertension. [facility] COURSE:  The patient is a 51-year-old female with a  history and physical as previously dictated. The patient came to the  hospital with left lower quadrant abdominal pain and dark stool. She  had been diagnosed in the office previously with UTI and possible  diverticulitis. She was started on metronidazole and ciprofloxacin. With these medications, she started having black stool and came to the  ER for evaluation. CT scan in the emergency room suggested some  possible colitis. She had elevated glucose on admission of 380. Her  white count was 10,900. The patient was admitted because of her melena and history of gastric  ulcers. She was brought in for IV antibiotics for her colitis as well  as fluids for resuscitation. The patient was seen in consultation by Dr Sarah Padron of GI who felt that her melena was likely from an upper GI  source. He scheduled her for an EGD. This was done and she was found  to have some gastritis and minimal esophagitis. Her hemoglobin did drip  somewhat with hydration. As on the , she was feeling a little  better. Because of the drop in hemoglobin, I opted to watch her another  night because of the drop in her blood hemoglobin level.   Basal insulin  was added for her regimen and she was followed, she did well and was  able to be discharged in improved condition on the . DISCHARGE MEDICATIONS:  Include the followin. Amlodipine benazepril which is Lotrel 5/10 one tablet daily. 2.  Gabapentin 400 mg each evening. 3.  Glimepiride which is Amaryl 2 mg each day. 4.  Valium 5 mg q. 6 hours p.r.n. anxiety. 5.  Metformin 1000 mg b.i.d. with meals. 6.  Paxil 10 mg each morning. 7.  Oxycodone 7.5/325 one tablet by mouth as needed ever 12 hours. 8.  Polyethylene glycol one scoop daily as needed for constipation. 9.  Lisinopril 40 mg orally was discontinued. 10.  I believe she was given a prescription for Protonix as well by the  GI doctor to followup.         Osman Lamar MD    D: 2022 3:25:20       T: 2022 2:44:16     [email or web address]  Job#: [id number]     Doc#: [id number]    CC:

## 2022-06-08 DIAGNOSIS — F33.0 MILD EPISODE OF RECURRENT MAJOR DEPRESSIVE DISORDER (H): ICD-10-CM

## 2022-06-08 RX ORDER — SERTRALINE HYDROCHLORIDE 100 MG/1
100 TABLET, FILM COATED ORAL DAILY
Qty: 90 TABLET | Refills: 0 | Status: SHIPPED | OUTPATIENT
Start: 2022-06-08 | End: 2022-06-24

## 2022-06-08 NOTE — TELEPHONE ENCOUNTER
Patient has appt scheduled on 06/24    Reason for call:  Medication   If this is a refill request, has the caller requested the refill from the pharmacy already? No  Will the patient be using a [facility]? No  Name of the pharmacy and phone number for the current request: [facility] [address]    Name of the medication requested: sertraline (ZOLOFT) 100 MG tablet      Phone number to reach patient:  Home number on file [phone] (home)    Best Time:  Anytime    Can we leave a detailed message on this number?  YES    Travel screening: Not Applicable

## 2022-06-24 ENCOUNTER — OFFICE VISIT (OUTPATIENT)
Dept: FAMILY MEDICINE | Facility: CLINIC | Age: 67
End: 2022-06-24
Payer: COMMERCIAL

## 2022-06-24 VITALS
OXYGEN SATURATION: 98 % | WEIGHT: 155 LBS | HEART RATE: 74 BPM | DIASTOLIC BLOOD PRESSURE: 79 MMHG | RESPIRATION RATE: 16 BRPM | HEIGHT: 64 IN | SYSTOLIC BLOOD PRESSURE: 137 MMHG | BODY MASS INDEX: 26.46 KG/M2

## 2022-06-24 DIAGNOSIS — Z00.00 ENCOUNTER FOR MEDICARE ANNUAL WELLNESS EXAM: Primary | ICD-10-CM

## 2022-06-24 DIAGNOSIS — M81.0 AGE-RELATED OSTEOPOROSIS WITHOUT CURRENT PATHOLOGICAL FRACTURE: ICD-10-CM

## 2022-06-24 DIAGNOSIS — Z12.11 SCREEN FOR COLON CANCER: ICD-10-CM

## 2022-06-24 DIAGNOSIS — F33.42 RECURRENT MAJOR DEPRESSION IN COMPLETE REMISSION (H): ICD-10-CM

## 2022-06-24 DIAGNOSIS — Z11.59 NEED FOR HEPATITIS C SCREENING TEST: ICD-10-CM

## 2022-06-24 DIAGNOSIS — Z87.891 PERSONAL HISTORY OF TOBACCO USE: ICD-10-CM

## 2022-06-24 PROCEDURE — 99397 PER PM REEVAL EST PAT 65+ YR: CPT | Mod: 25 | Performed by: FAMILY MEDICINE

## 2022-06-24 PROCEDURE — G0296 VISIT TO DETERM LDCT ELIG: HCPCS | Performed by: FAMILY MEDICINE

## 2022-06-24 PROCEDURE — 91305 COVID-19,PF,PFIZER (12+ YRS): CPT | Performed by: FAMILY MEDICINE

## 2022-06-24 PROCEDURE — 0054A COVID-19,PF,PFIZER (12+ YRS): CPT | Performed by: FAMILY MEDICINE

## 2022-06-24 PROCEDURE — 99213 OFFICE O/P EST LOW 20 MIN: CPT | Mod: 25 | Performed by: FAMILY MEDICINE

## 2022-06-24 RX ORDER — SERTRALINE HYDROCHLORIDE 100 MG/1
100 TABLET, FILM COATED ORAL DAILY
Qty: 90 TABLET | Refills: 3 | Status: SHIPPED | OUTPATIENT
Start: 2022-06-24

## 2022-06-24 ASSESSMENT — PATIENT HEALTH QUESTIONNAIRE - PHQ9
SUM OF ALL RESPONSES TO PHQ QUESTIONS 1-9: 0
SUM OF ALL RESPONSES TO PHQ QUESTIONS 1-9: 0
10. IF YOU CHECKED OFF ANY PROBLEMS, HOW DIFFICULT HAVE THESE PROBLEMS MADE IT FOR YOU TO DO YOUR WORK, TAKE CARE OF THINGS AT HOME, OR GET ALONG WITH OTHER PEOPLE: NOT DIFFICULT AT ALL

## 2022-06-24 ASSESSMENT — ENCOUNTER SYMPTOMS
MYALGIAS: 0
SORE THROAT: 0
CONSTIPATION: 0
DYSURIA: 0
DIARRHEA: 0
BREAST MASS: 0
NERVOUS/ANXIOUS: 0
PARESTHESIAS: 0
ABDOMINAL PAIN: 0
ARTHRALGIAS: 0
COUGH: 0
EYE PAIN: 0
PALPITATIONS: 0
HEARTBURN: 1
NAUSEA: 0
DIZZINESS: 0
SHORTNESS OF BREATH: 0
FREQUENCY: 0
HEADACHES: 0
HEMATOCHEZIA: 0
CHILLS: 0
JOINT SWELLING: 0
HEMATURIA: 0
FEVER: 0
WEAKNESS: 0

## 2022-06-24 ASSESSMENT — ACTIVITIES OF DAILY LIVING (ADL): CURRENT_FUNCTION: NO ASSISTANCE NEEDED

## 2022-06-24 NOTE — LETTER
My Depression Action Plan  Name: Bella Ricks   Date of Birth 1955  Date: 6/24/2022    My doctor: Allyson Valdes   My clinic: [facility]  [address] [facility], [facility] [address] [facility] [address] [phone]          GREEN    ZONE   Good Control    What it looks like:     Things are going generally well. You have normal ups and downs. You may even feel depressed from time to time, but bad moods usually last less than a day.   What you need to do:  1. Continue to care for yourself (see self care plan)  2. Check your depression survival kit and update it as needed  3. Follow your physician s recommendations including any medication.  4. Do not stop taking medication unless you consult with your physician first.           YELLOW         ZONE Getting Worse    What it looks like:     Depression is starting to interfere with your life.     It may be hard to get out of bed; you may be starting to isolate yourself from others.    Symptoms of depression are starting to last most all day and this has happened for several days.     You may have suicidal thoughts but they are not constant.   What you need to do:     1. Call your care team. Your response to treatment will improve if you keep your care team informed of your progress. Yellow periods are signs an adjustment may need to be made.     2. Continue your self-care.  Just get dressed and ready for the day.  Don't give yourself time to talk yourself out of it.    3. Talk to someone in your support network.    4. Open up your Depression Self-Care Plan/Wellness Kit.           RED    ZONE Medical Alert - Get Help    What it looks like:     Depression is seriously interfering with your life.     You may experience these or other symptoms: You can t get out of bed most days, can t work or engage in other necessary activities, you have trouble taking care of basic hygiene, or basic responsibilities, thoughts of suicide or death that  will not go away, self-injurious behavior.     What you need to do:  1. Call your care team and request a same-day appointment. If they are not available (weekends or after hours) call your local crisis line, emergency room or 911.          Depression Self-Care Plan / Wellness Kit    Many people find that medication and therapy are helpful treatments for managing depression. In addition, making small changes to your everyday life can help to boost your mood and improve your wellbeing. Below are some tips for you to consider. Be sure to talk with your medical provider and/or behavioral health consultant if your symptoms are worsening or not improving.     Sleep   Sleep hygiene  means all of the habits that support good, restful sleep. It includes maintaining a consistent bedtime and wake time, using your bedroom only for sleeping or sex, and keeping the bedroom dark and free of distractions like a computer, smartphone, or television.     Develop a Healthy Routine  Maintain good hygiene. Get out of bed in the morning, make your bed, brush your teeth, take a shower, and get dressed. Don t spend too much time viewing media that makes you feel stressed. Find time to relax each day.    Exercise  Get some form of exercise every day. This will help reduce pain and release endorphins, the  feel good  chemicals in your brain. It can be as simple as just going for a walk or doing some gardening, anything that will get you moving.      Diet  Strive to eat healthy foods, including fruits and vegetables. Drink plenty of water. Avoid excessive sugar, caffeine, alcohol, and other mood-altering substances.     Stay Connected with Others  Stay in touch with friends and family members.    Manage Your Mood  Try deep breathing, massage therapy, biofeedback, or meditation. Take part in fun activities when you can. Try to find something to smile about each day.     Psychotherapy  Be open to working with a therapist if your provider  recommends it.     Medication  Be sure to take your medication as prescribed. Most anti-depressants need to be taken every day. It usually takes several weeks for medications to work. Not all medicines work for all people. It is important to follow-up with your provider to make sure you have a treatment plan that is working for you. Do not stop your medication abruptly without first discussing it with your provider.    Crisis Resources   These hotlines are for both adults and children. They and are open 24 hours a day, 7 days a week unless noted otherwise.      National Suicide Prevention Lifeline   7-510-707-UQTF (0379)      Crisis Text Line    www.crisistextline.org  Text HOME to 174104 from anywhere in the [place], anytime, about any type of crisis. A live, trained crisis counselor will receive the text and respond quickly.      [place] Lifeline for LGBTQ Youth  A national crisis intervention and suicide lifeline for LGBTQ youth under 25. Provides a safe place to talk without judgement. Call 1-390.136.2436; text START to 165090 or visit www.thetrevorproject.org to talk to a trained counselor.      For [facility] crisis numbers, visit the [facility] website at:  https://[place].gov/dhs/people-we-serve/adults/health-care/mental-health/resources/crisis-contacts.jsp

## 2022-06-24 NOTE — PROGRESS NOTES
ASSESSMENT / PLAN:       ICD-10-CM    1. Encounter for Medicare annual wellness exam  Z00.00 CBC with platelets and differential     Comprehensive metabolic panel (BMP + Alb, Alk Phos, ALT, AST, Total. Bili, TP)     Lipid panel reflex to direct LDL Fasting   2. Need for hepatitis C screening test  Z11.59 Hepatitis C Screen Reflex to HCV RNA Quant and Genotype   3. Screen for colon cancer  Z12.11 COLOGUARD(EXACT SCIENCES)   4. Personal history of tobacco use  Z87.891 Prof fee: Shared Decision Making for Lung Cancer Screening     CT Chest Lung Cancer Scrn Low Dose wo   5. Age-related osteoporosis without current pathological fracture  M81.0 cholecalciferol 50 MCG (2000 UT) tablet     DX Hip/Pelvis/Spine   6. Recurrent major depression in complete remission (H)  F33.42 sertraline (ZOLOFT) 100 MG tablet     Medical decision making: Patient is here today for an annual wellness visit.  Currently her depression is in remission but she would like to continue taking her Zoloft daily.  This is refilled for her.  Patient has a history of osteoporosis.  She used to be on Fosamax.  Subsequently she completed Fosamax and had a repeat scan in 2018 that showed stability.  She was asked to for repeat DEXA scan in 2 years time.  This needs to be pursued.  Patient had changes in her mammogram and had a biopsy done.  Clip placement was done and she was asked to follow-up mammogram in 6 months time.  This has not been pursued.  Orders are placed.  Patient had a colonoscopy 10 years ago and was told she was in the clear.  She does not want to pursue colonoscopy, agreeable for Cologuard that has been ordered for her.    COUNSELING:  Reviewed preventive health counseling, as reflected in patient instructions       Regular exercise       Healthy diet/nutrition       Vision screening       Hearing screening       Osteoporosis prevention/bone health       Consider lung cancer screening for ages 55-80 years (77 for Medicare) and 20 pack-year  "smoking history         Colon cancer screening    Estimated body mass index is 26.37 kg/m  as calculated from the following:    Height as of 7/19/19: 1.62 m (5' 3.78\").    Weight as of 12/15/20: 69.2 kg (152 lb 9.6 oz).        She reports that she quit smoking about 12 years ago. She has never used smokeless tobacco.      Appropriate preventive services were discussed with this patient, including applicable screening as appropriate for cardiovascular disease, diabetes, osteopenia/osteoporosis, and glaucoma.  As appropriate for age/gender, discussed screening for colorectal cancer, prostate cancer, breast cancer, and cervical cancer. Checklist reviewing preventive services available has been given to the patient.    Reviewed patients plan of care and provided an AVS. The Basic Care Plan (routine screening as documented in Health Maintenance) for Bella meets the Care Plan requirement. This Care Plan has been established and reviewed with the Patient.    SUBJECTIVE:   Bella Ricks is a 66 year old female who presents for Preventive Visit.    Chief Complaint   Patient presents with     Annual Visit     AWV- not fasting.          Patient has been advised of split billing requirements and indicates understanding: Yes  Are you in the first 12 months of your Medicare coverage?  No    Healthy Habits:     In general, how would you rate your overall health?  Good    Frequency of exercise:  None    Do you usually eat at least 4 servings of fruit and vegetables a day, include whole grains    & fiber and avoid regularly eating high fat or \"junk\" foods?  No    Taking medications regularly:  Yes    Medication side effects:  None    Ability to successfully perform activities of daily living:  No assistance needed    Home Safety:  No safety concerns identified    Hearing Impairment:  No hearing concerns    In the past 6 months, have you been bothered by leaking of urine?  No    In general, how would you rate your overall mental or " emotional health?  Good      PHQ-2 Total Score: 0    Additional concerns today:  No    Do you feel safe in your environment? Yes    Have you ever done Advance Care Planning? (For example, a Health Directive, POLST, or a discussion with a medical provider or your loved ones about your wishes): No, advance care planning information given to patient to review.  Advanced care planning was discussed at today's visit.      Fall risk  Fallen 2 or more times in the past year?: No  Any fall with injury in the past year?: No  click delete button to remove this line now  Cognitive Screening   1) Repeat 3 items (Leader, Season, Table)      2) Clock draw:   NORMAL  3) 3 item recall: Recalls 3 objects  Results: 3 items recalled: COGNITIVE IMPAIRMENT LESS LIKELY    Mini-CogTM Copyright S Lisa. Licensed by the author for use in [employer]; reprinted with permission ([email or web address]). All rights reserved.      Do you have sleep apnea, excessive snoring or daytime drowsiness?: no    Reviewed and updated as needed this visit by clinical staff   Tobacco  Allergies                 Reviewed and updated as needed this visit by Provider                   Social History     Tobacco Use     Smoking status: Former Smoker     Packs/day: 0.50     Years: 40.00     Pack years: 20.00     Start date: 1969     Quit date: 2009     Years since quittin.5     Smokeless tobacco: Never Used   Substance Use Topics     Alcohol use: No     If you drink alcohol do you typically have >3 drinks per day or >7 drinks per week? No    Alcohol Use 2022   Prescreen: >3 drinks/day or >7 drinks/week? Not Applicable   Prescreen: >3 drinks/day or >7 drinks/week? -   No flowsheet data found.        Current providers sharing in care for this patient include:   Patient Care Team:  Allyson Valdes MD as PCP - General (Family Practice)  Allyson Valdes MD as Assigned PCP    The following health maintenance items are reviewed in Epic and  correct as of today:  Health Maintenance Due   Topic Date Due     HEPATITIS C SCREENING  Never done     LUNG CANCER SCREENING  Never done     ZOSTER IMMUNIZATION (3 of 3) 2018     COLORECTAL CANCER SCREENING  2020     ADVANCE CARE PLANNING  2021     BP Readings from Last 3 Encounters:   22 137/79   12/15/20 124/86    Wt Readings from Last 3 Encounters:   22 70.3 kg (155 lb)   12/15/20 69.2 kg (152 lb 9.6 oz)   19 68.9 kg (152 lb)                  Patient Active Problem List   Diagnosis     Major Depression, Recurrent     Osteoporosis     Recurrent Dislocation Of The Shoulder Region     External hemorrhoid     Recurrent major depression in complete remission (H)     Past Surgical History:   Procedure Laterality Date     HC REMOVAL OF TONSILS,<11 Y/O      Description: Tonsillectomy;  Recorded: 2009;     MA STEREOTACTIC BREAST BIOPSY VACUUM L Left 2021     OOPHORECTOMY Right      ORIF DISTAL RADIUS FRACTURE  2018     [place] REMOVAL OF OVARY/TUBE(S)      Description: Salpingo-oophorectomy;  Recorded: 2009;  Comments: ? R side     WRIST FRACTURE SURGERY Bilateral 2017    distal radius ORIF       Social History     Tobacco Use     Smoking status: Former Smoker     Packs/day: 0.50     Years: 40.00     Pack years: 20.00     Start date: 1969     Quit date: 2009     Years since quittin.5     Smokeless tobacco: Never Used   Substance Use Topics     Alcohol use: No     Family History   Problem Relation Age of Onset     Osteoporosis Mother      Aneurysm Father         cause of death, AAA     Aneurysm Paternal Uncle         Cause of Death, AAA     Aneurysm Paternal Grandfather      Dementia Brother         1/2 brother     Osteoporosis Maternal Grandmother          Current Outpatient Medications   Medication Sig Dispense Refill     cholecalciferol 50 MCG ( UT) tablet Take 2,000 Units by mouth       cholecalciferol, vitamin D3, 2,000 unit Tab  "[CHOLECALCIFEROL, VITAMIN D3, 2,000 UNIT TAB] Take 2,000 Units by mouth every evening.       sertraline (ZOLOFT) 100 MG tablet Take 1 tablet (100 mg) by mouth daily 90 tablet 3     Mammogram Screening: Mammogram Screening - Alternate mammogram schedule due to abnormal history Had a biopsy and was told to come back in 6 months for mammogram, did not follow through    Breast CA Risk Assessment (FHS-7) 6/24/2022   Do you have a family history of breast, colon, or ovarian cancer? No / Unknown         NEEDS MAMMOGRAM  Pertinent mammograms are reviewed under the imaging tab.    Review of Systems   Constitutional: Negative for chills and fever.   HENT: Negative for congestion, ear pain, hearing loss and sore throat.    Eyes: Negative for pain and visual disturbance.   Respiratory: Negative for cough and shortness of breath.    Cardiovascular: Negative for chest pain, palpitations and peripheral edema.   Gastrointestinal: Positive for heartburn. Negative for abdominal pain, constipation, diarrhea, hematochezia and nausea.   Breasts:  Negative for tenderness, breast mass and discharge.   Genitourinary: Negative for dysuria, frequency, genital sores, hematuria, pelvic pain, urgency, vaginal bleeding and vaginal discharge.   Musculoskeletal: Negative for arthralgias, joint swelling and myalgias.   Skin: Negative for rash.   Neurological: Negative for dizziness, weakness, headaches and paresthesias.   Psychiatric/Behavioral: Negative for mood changes. The patient is not nervous/anxious.    All other systems reviewed and are negative.      OBJECTIVE:   /79 (BP Location: Left arm, Patient Position: Sitting, Cuff Size: Adult Regular)   Pulse 74   Resp 16   Ht 1.613 m (5' 3.5\")   Wt 70.3 kg (155 lb)   SpO2 98%   BMI 27.03 kg/m   Estimated body mass index is 27.03 kg/m  as calculated from the following:    Height as of this encounter: 1.613 m (5' 3.5\").    Weight as of this encounter: 70.3 kg (155 lb).  Physical " Exam  GENERAL: healthy, alert and no distress  NECK: no adenopathy, no asymmetry, masses, or scars and thyroid normal to palpation  RESP: lungs clear to auscultation - no rales, rhonchi or wheezes  CV: regular rate and rhythm, normal S1 S2, no S3 or S4, no murmur, click or rub, no peripheral edema and peripheral pulses strong  ABDOMEN: soft, nontender, no hepatosplenomegaly, no masses and bowel sounds normal  MS: no gross musculoskeletal defects noted, no edema    Diagnostic Test Results:  Labs reviewed in Epic  No results found for this or any previous visit (from the past 24 hour(s)).    Counseling Resources:  ATP IV Guidelines  Pooled Cohorts Equation Calculator  Breast Cancer Risk Calculator  Breast Cancer: Medication to Reduce Risk  FRAX Risk Assessment  ICSI Preventive Guidelines  Dietary Guidelines for Americans, 2010  Healthcare [employer] MyPlate  ASA Prophylaxis  Lung CA Screening    Allyson Valdes MD  [facility]    Identified Health Risks:  Answers for HPI/ROS submitted by the patient on 6/24/2022  If you checked off any problems, how difficult have these problems made it for you to do your work, take care of things at home, or get along with other people?: Not difficult at all  PHQ9 TOTAL SCORE: 0      Lung Cancer Screening Shared Decision Making Visit     Bella Ricks, a 66 year old female, is eligible for lung cancer screening    History   Smoking Status     Former Smoker     Packs/day: 0.50     Years: 40.00     Start date: 1/1/1969     Quit date: 12/12/2009   Smokeless Tobacco     Never Used       I have discussed with patient the risks and benefits of screening for lung cancer with low-dose CT.     The risks include:    radiation exposure: one low dose chest CT has as much ionizing radiation as about 15 chest x-rays, or 6 months of background radiation living in [place]      false positives: most findings/nodules are NOT cancer, but some might still require additional diagnostic  evaluation, including biopsy    over-diagnosis: some slow growing cancers that might never have been clinically significant will be detected and treated unnecessarily     The benefit of early detection of lung cancer is contingent upon adherence to annual screening or more frequent follow up if indicated.     Furthermore, to benefit from screening, Bella must be willing and able to undergo diagnostic procedures, if indicated. Although no specific guide is available for determining severity of comorbidities, it is reasonable to withhold screening in patients who have greater mortality risk from other diseases.     We did discuss that the best way to prevent lung cancer is to not smoke.    Some patients may value a numeric estimation of lung cancer risk when evaluating if lung cancer screening is right for them, here is one calculator:    ShouldIScreen

## 2022-06-24 NOTE — PATIENT INSTRUCTIONS
Patient Education   Personalized Prevention Plan  You are due for the preventive services outlined below.  Your care team is available to assist you in scheduling these services.  If you have already completed any of these items, please share that information with your care team to update in your medical record.  Health Maintenance Due   Topic Date Due     ANNUAL REVIEW OF HM ORDERS  Never done     Depression Action Plan  Never done     Hepatitis C Screening  Never done     LUNG CANCER SCREENING  Never done     Zoster (Shingles) Vaccine (3 of 3) 09/01/2018     Colorectal Cancer Screening  07/14/2020     Discuss Advance Care Planning  01/18/2021     COVID-19 Vaccine (3 - Booster for Jameel series) 04/15/2022       Exercise for a Healthier Heart  You may wonder how you can improve the health of your heart. If you re thinking about exercise, you re on the right track. You don t need to become an athlete. But you do need a certain amount of brisk exercise to help strengthen your heart. If you have been diagnosed with a heart condition, your healthcare provider may advise exercise to help stabilize your condition. To help make exercise a habit, choose safe, fun activities.      Exercise with a friend. When activity is fun, you're more likely to stick with it.   Before you start  Check with your healthcare provider before starting an exercise program. This is especially important if you have not been active for a while. It's also important if you have a long-term (chronic) health problem such as heart disease, diabetes, or obesity. Or if you are at high risk for having these problems.   Why exercise?  Exercising regularly offers many healthy rewards. It can help you do all of the following:     Improve your blood cholesterol level to help prevent further heart trouble    Lower your blood pressure to help prevent a stroke or heart attack    Control diabetes, or reduce your risk of getting this disease    Improve your  heart and lung function    Reach and stay at a healthy weight    Make your muscles stronger so you can stay active    Prevent falls and fractures by slowing the loss of bone mass (osteoporosis)    Manage stress better    Reduce your blood pressure    Improve your sense of self and your body image  Exercise tips      Ease into your routine. Set small goals. Then build on them. If you are not sure what your activity level should be, talk with your healthcare provider first before starting an exercise routine.    Exercise on most days. Aim for a total of 150 minutes (2 hours and 30 minutes) or more of moderate-intensity aerobic activity each week. Or 75 minutes (1 hour and 15 minutes) or more of vigorous-intensity aerobic activity each week. Or try for a combination of both. Moderate activity means that you breathe heavier and your heart rate increases but you can still talk. Think about doing 40 minutes of moderate exercise, 3 to 4 times a week. For best results, activity should last for about 40 minutes to lower blood pressure and cholesterol. It's OK to work up to the 40-minute period over time. Examples of moderate-intensity activity are walking 1 mile in 15 minutes. Or doing 30 to 45 minutes of yard work.    Step up your daily activity level.  Along with your exercise program, try being more active the whole day. Walk instead of drive. Or park further away so that you take more steps each day. Do more household tasks or yard work. You may not be able to meet the advised mount of physical activity. But doing some moderate- or vigorous-intensity aerobic activity can help reduce your risk for heart disease. Your healthcare provider can help you figure out what is best for you.    Choose 1 or more activities you enjoy.  Walking is one of the easiest things you can do. You can also try swimming, riding a bike, dancing, or taking an exercise class.    When to call your healthcare provider  Call your healthcare provider  if you have any of these:     Chest pain or feel dizzy or lightheaded    Burning, tightness, pressure, or heaviness in your chest, neck, shoulders, back, or arms    Abnormal shortness of breath    More joint or muscle pain    A very fast or irregular heartbeat (palpitations)  [employer] last reviewed this educational content on 7/1/2019 2000-2021 [employer]. All rights reserved. This information is not intended as a substitute for professional medical care. Always follow your healthcare professional's instructions.          Understanding USDA MyPlate  The USDA has guidelines to help you make healthy food choices. These are called MyPlate. MyPlate shows the food groups that make up healthy meals using the image of a place setting. Before you eat, think about the healthiest choices for what to put on your plate or in your cup or bowl. To learn more about building a healthy plate, visit www.choosemyplate.gov.    The food groups    Fruits. Any fruit or 100% fruit juice counts as part of the Fruit Group. Fruits may be fresh, canned, frozen, or dried, and may be whole, cut-up, or pureed. Make 1/2 of your plate fruits and vegetables.    Vegetables. Any vegetable or 100% vegetable juice counts as a member of the Vegetable Group. Vegetables may be fresh, frozen, canned, or dried. They can be served raw or cooked and may be whole, cut-up, or mashed. Make 1/2 of your plate fruits and vegetables.    Grains. All foods made from grains are part of the Grains Group. These include wheat, rice, oats, cornmeal, and barley. Grains are often used to make foods such as bread, pasta, oatmeal, cereal, tortillas, and grits. Grains should be no more than 1/4 of your plate. At least half of your grains should be whole grains.    Protein. This group includes meat, poultry, seafood, beans and peas, eggs, processed soy products (such as tofu), nuts (including nut butters), and seeds. Make protein choices no more than 1/4 of your  plate. Meat and poultry choices should be lean or low fat.    Dairy. The Dairy Group includes all fluid milk products and foods made from milk that contain calcium, such as yogurt and cheese. (Foods that have little calcium, such as cream, butter, and cream cheese, are not part of this group.) Most dairy choices should be low-fat or fat-free.    Oils. Oils aren't a food group, but they do contain essential nutrients. However it's important to watch your intake of oils. These are fats that are liquid at room temperature. They include canola, corn, olive, soybean, vegetable, and sunflower oil. Foods that are mainly oil include mayonnaise, certain salad dressings, and soft margarines. You likely already get your daily oil allowance from the foods you eat.  Things to limit  Eating healthy also means limiting these things in your diet:       Salt (sodium). Many processed foods have a lot of sodium. To keep sodium intake down, eat fresh vegetables, meats, poultry, and seafood when possible. Purchase low-sodium, reduced-sodium, or no-salt-added food products at the store. And don't add salt to your meals at home. Instead, season them with herbs and spices such as dill, oregano, cumin, and paprika. Or try adding flavor with lemon or lime zest and juice.    Saturated fat. Saturated fats are most often found in animal products such as beef, pork, and chicken. They are often solid at room temperature, such as butter. To reduce your saturated fat intake, choose leaner cuts of meat and poultry. And try healthier cooking methods such as grilling, broiling, roasting, or baking. For a simple lower-fat swap, use plain nonfat yogurt instead of mayonnaise when making potato salad or macaroni salad.    Added sugars. These are sugars added to foods. They are in foods such as ice cream, candy, soda, fruit drinks, sports drinks, energy drinks, cookies, pastries, jams, and syrups. Cut down on added sugars by sharing sweet treats with a  family member or friend. You can also choose fruit for dessert, and drink water or other unsweetened beverages.     [employer] last reviewed this educational content on 6/1/2020 2000-2021 [employer]. All rights reserved. This information is not intended as a substitute for professional medical care. Always follow your healthcare professional's instructions.           Lung Cancer Screening   Frequently Asked Questions  If you are at high-risk for lung cancer, getting screened with low-dose computed tomography (LDCT) every year can help save your life. This handout offers answers to some of the most common questions about lung cancer screening. If you have other questions, please call [phone] ([phone]).     What is it?  Lung cancer screening uses special X-ray technology to create an image of your lung tissue. The exam is quick and easy and takes less than 10 seconds. We don t give you any medicine or use any needles. You can eat before and after the exam. You don t need to change your clothes as long as the clothing on your chest doesn t contain metal. But, you do need to be able to hold your breath for at least 6 seconds during the exam.    What is the goal of lung cancer screening?  The goal of lung cancer screening is to save lives. Many times, lung cancer is not found until a person starts having physical symptoms. Lung cancer screening can help detect lung cancer in the earliest stages when it may be easier to treat.    Who should be screened for lung cancer?  We suggest lung cancer screening for anyone who is at high-risk for lung cancer. You are in the high-risk group if you:      are between the ages of 55 and 79, and    have smoked at least 1 pack of cigarettes a day for 20 or more years, and    still smoke or have quit within the past 15 years.    However, if you have a new cough or shortness of breath, you should talk to your doctor before being screened.    Why does it  matter if I have symptoms?  Certain symptoms can be a sign that you have a condition in your lungs that should be checked and treated by your doctor. These symptoms include fever, chest pain, a new or changing cough, shortness of breath that you have never felt before, coughing up blood or unexplained weight loss. Having any of these symptoms can greatly affect the results of lung cancer screening.       Should all smokers get an LDCT lung cancer screening exam?  It depends. Lung cancer screening is for a very specific group of men and women who have a history of heavy smoking over a long period of time (see  Who should be screened for lung cancer  above).  I am in the high-risk group, but have been diagnosed with cancer in the past. Is LDCT lung cancer screening right for me?  In some cases, you should not have LDCT lung screening, such as when your doctor is already following your cancer with CT scan studies. Your doctor will help you decide if LDCT lung screening is right for you.  Do I need to have a screening exam every year?  Yes. If you are in the high-risk group described earlier, you should get an LDCT lung cancer screening exam every year until you are 79, or are no longer willing or able to undergo screening and possible procedures to diagnose and treat lung cancer.  How effective is LDCT at preventing death from lung cancer?  Studies have shown that LDCT lung cancer screening can lower the risk of death from lung cancer by 20 percent in people who are at high-risk.  What are the risks?  There are some risks and limitations of LDCT lung cancer screening. We want to make sure you understand the risks and benefits, so please let us know if you have any questions. Your doctor may want to talk with you more about these risks.    Radiation exposure: As with any exam that uses radiation, there is a very small increased risk of cancer. The amount of radiation in LDCT is small--about the same amount a person  would get from a mammogram. Your doctor orders the exam when he or she feels the potential benefits outweigh the risks.    False negatives: No test is perfect, including LDCT. It is possible that you may have a medical condition, including lung cancer, that is not found during your exam. This is called a false negative result.    False positives and more testing: LDCT very often finds something in the lung that could be cancer, but in fact is not. This is called a false positive result. False positive tests often cause anxiety. To make sure these findings are not cancer, you may need to have more tests. These tests will be done only if you give us permission. Sometimes patients need a treatment that can have side effects, such as a biopsy. For more information on false positives, see  What can I expect from the results?     Findings not related to lung cancer: Your LDCT exam also takes pictures of areas of your body next to your lungs. In a very small number of cases, the CT scan will show an abnormal finding in one of these areas, such as your kidneys, adrenal glands, liver or thyroid. This finding may not be serious, but you may need more tests. Your doctor can help you decide what other tests you may need, if any.  What can I expect from the results?  About 1 out of 4 LDCT exams will find something that may need more tests. Most of the time, these findings are lung nodules. Lung nodules are very small collections of tissue in the lung. These nodules are very common, and the vast majority--more than 97 percent--are not cancer (benign). Most are normal lymph nodes or small areas of scarring from past infections.  But, if a small lung nodule is found to be cancer, the cancer can be cured more than 90 percent of the time. To know if the nodule is cancer, we may need to get more images before your next yearly screening exam. If the nodule has suspicious features (for example, it is large, has an odd shape or grows  over time), we will refer you to a specialist for further testing.  Will my doctor also get the results?  Yes. Your doctor will get a copy of your results.  Is it okay to keep smoking now that there s a cancer screening exam?  No. Tobacco is one of the strongest cancer-causing agents. It causes not only lung cancer, but other cancers and cardiovascular (heart) diseases as well. The damage caused by smoking builds over time. This means that the longer you smoke, the higher your risk of disease. While it is never too late to quit, the sooner you quit, the better.  Where can I find help to quit smoking?  The best way to prevent lung cancer is to stop smoking. If you have already quit smoking, congratulations and keep it up! For help on quitting smoking, please call [employer] at 3-763-QUITNOW (1-294.940.3673) or the [employer] at 1-184.522.8771 to find local resources near you.  One-on-one health coaching:  If you d prefer to work individually with a health care provider on tobacco cessation, we offer:      Medication Therapy Management:  Our specially trained pharmacists work closely with you and your doctor to help you quit smoking.  Call [phone] or [phone] (toll free).

## 2022-06-29 ENCOUNTER — LAB (OUTPATIENT)
Dept: LAB | Facility: CLINIC | Age: 67
End: 2022-06-29
Payer: COMMERCIAL

## 2022-06-29 DIAGNOSIS — Z11.59 NEED FOR HEPATITIS C SCREENING TEST: ICD-10-CM

## 2022-06-29 DIAGNOSIS — Z00.00 ENCOUNTER FOR MEDICARE ANNUAL WELLNESS EXAM: ICD-10-CM

## 2022-06-29 LAB
ALBUMIN SERPL BCG-MCNC: 4 G/DL (ref 3.5–5.2)
ALP SERPL-CCNC: 76 U/L (ref 35–104)
ALT SERPL W P-5'-P-CCNC: 14 U/L (ref 10–35)
ANION GAP SERPL CALCULATED.3IONS-SCNC: 11 MMOL/L (ref 7–15)
AST SERPL W P-5'-P-CCNC: 18 U/L (ref 10–35)
BASOPHILS # BLD AUTO: 0 10E3/UL (ref 0–0.2)
BASOPHILS NFR BLD AUTO: 0 %
BILIRUB SERPL-MCNC: 0.2 MG/DL
BUN SERPL-MCNC: 17.8 MG/DL (ref 8–23)
CALCIUM SERPL-MCNC: 9.7 MG/DL (ref 8.8–10.2)
CHLORIDE SERPL-SCNC: 103 MMOL/L (ref 98–107)
CHOLEST SERPL-MCNC: 216 MG/DL
CREAT SERPL-MCNC: 1.02 MG/DL (ref 0.51–0.95)
DEPRECATED HCO3 PLAS-SCNC: 25 MMOL/L (ref 22–29)
EOSINOPHIL # BLD AUTO: 0.1 10E3/UL (ref 0–0.7)
EOSINOPHIL NFR BLD AUTO: 2 %
ERYTHROCYTE [DISTWIDTH] IN BLOOD BY AUTOMATED COUNT: 13.8 % (ref 10–15)
GFR SERPL CREATININE-BSD FRML MDRD: 60 ML/MIN/1.73M2
GLUCOSE SERPL-MCNC: 94 MG/DL (ref 70–99)
HCT VFR BLD AUTO: 44.5 % (ref 35–47)
HDLC SERPL-MCNC: 61 MG/DL
HGB BLD-MCNC: 14.5 G/DL (ref 11.7–15.7)
IMM GRANULOCYTES # BLD: 0 10E3/UL
IMM GRANULOCYTES NFR BLD: 1 %
LDLC SERPL CALC-MCNC: 127 MG/DL
LYMPHOCYTES # BLD AUTO: 1.7 10E3/UL (ref 0.8–5.3)
LYMPHOCYTES NFR BLD AUTO: 27 %
MCH RBC QN AUTO: 28.5 PG (ref 26.5–33)
MCHC RBC AUTO-ENTMCNC: 32.6 G/DL (ref 31.5–36.5)
MCV RBC AUTO: 87 FL (ref 78–100)
MONOCYTES # BLD AUTO: 0.4 10E3/UL (ref 0–1.3)
MONOCYTES NFR BLD AUTO: 6 %
NEUTROPHILS # BLD AUTO: 4.1 10E3/UL (ref 1.6–8.3)
NEUTROPHILS NFR BLD AUTO: 64 %
NONHDLC SERPL-MCNC: 155 MG/DL
PLATELET # BLD AUTO: 241 10E3/UL (ref 150–450)
POTASSIUM SERPL-SCNC: 4.9 MMOL/L (ref 3.4–5.3)
PROT SERPL-MCNC: 6.1 G/DL (ref 6.4–8.3)
RBC # BLD AUTO: 5.09 10E6/UL (ref 3.8–5.2)
SODIUM SERPL-SCNC: 139 MMOL/L (ref 136–145)
TRIGL SERPL-MCNC: 138 MG/DL
WBC # BLD AUTO: 6.4 10E3/UL (ref 4–11)

## 2022-06-29 PROCEDURE — 86803 HEPATITIS C AB TEST: CPT

## 2022-06-29 PROCEDURE — 85025 COMPLETE CBC W/AUTO DIFF WBC: CPT

## 2022-06-29 PROCEDURE — 80061 LIPID PANEL: CPT

## 2022-06-29 PROCEDURE — 36415 COLL VENOUS BLD VENIPUNCTURE: CPT

## 2022-06-29 PROCEDURE — 80053 COMPREHEN METABOLIC PANEL: CPT

## 2022-06-30 DIAGNOSIS — R79.89 ELEVATED SERUM CREATININE: Primary | ICD-10-CM

## 2022-06-30 LAB — HCV AB SERPL QL IA: NONREACTIVE

## 2022-07-10 ENCOUNTER — HOSPITAL ENCOUNTER (OUTPATIENT)
Dept: CT IMAGING | Facility: HOSPITAL | Age: 67
Discharge: HOME | End: 2022-07-10
Attending: FAMILY MEDICINE | Admitting: FAMILY MEDICINE
Payer: COMMERCIAL

## 2022-07-10 DIAGNOSIS — Z87.891 PERSONAL HISTORY OF TOBACCO USE: ICD-10-CM

## 2022-07-10 PROCEDURE — 71271 CT THORAX LUNG CANCER SCR C-: CPT

## 2022-07-11 DIAGNOSIS — R93.89 ABNORMAL CT SCAN: Primary | ICD-10-CM

## 2022-07-18 DIAGNOSIS — R19.5 POSITIVE COLORECTAL CANCER SCREENING USING COLOGUARD TEST: Primary | ICD-10-CM

## 2022-07-18 LAB — NONINV COLON CA DNA+OCC BLD SCRN STL QL: POSITIVE

## 2022-08-16 ENCOUNTER — HOSPITAL ENCOUNTER (OUTPATIENT)
Dept: BONE DENSITY | Facility: HOSPITAL | Age: 67
Discharge: HOME | End: 2022-08-16
Attending: FAMILY MEDICINE | Admitting: FAMILY MEDICINE
Payer: COMMERCIAL

## 2022-08-16 DIAGNOSIS — M81.0 AGE-RELATED OSTEOPOROSIS WITHOUT CURRENT PATHOLOGICAL FRACTURE: ICD-10-CM

## 2022-08-16 PROCEDURE — 77080 DXA BONE DENSITY AXIAL: CPT

## 2022-08-29 ENCOUNTER — APPOINTMENT (OUTPATIENT)
Dept: RADIOLOGY | Facility: HOSPITAL | Age: 67
End: 2022-08-29
Attending: EMERGENCY MEDICINE
Payer: OTHER MISCELLANEOUS

## 2022-08-29 ENCOUNTER — HOSPITAL ENCOUNTER (EMERGENCY)
Facility: HOSPITAL | Age: 67
Discharge: HOME | End: 2022-08-29
Attending: EMERGENCY MEDICINE | Admitting: EMERGENCY MEDICINE
Payer: OTHER MISCELLANEOUS

## 2022-08-29 VITALS
DIASTOLIC BLOOD PRESSURE: 61 MMHG | SYSTOLIC BLOOD PRESSURE: 127 MMHG | OXYGEN SATURATION: 98 % | RESPIRATION RATE: 18 BRPM | HEART RATE: 70 BPM

## 2022-08-29 DIAGNOSIS — S43.004A SHOULDER DISLOCATION, RIGHT, INITIAL ENCOUNTER: ICD-10-CM

## 2022-08-29 PROCEDURE — 999N000065 XR SHOULDER RIGHT 2 VIEWS

## 2022-08-29 PROCEDURE — 23650 CLTX SHO DSLC W/MNPJ WO ANES: CPT | Mod: RT

## 2022-08-29 PROCEDURE — 99285 EMERGENCY DEPT VISIT HI MDM: CPT | Mod: 25

## 2022-08-29 PROCEDURE — 73030 X-RAY EXAM OF SHOULDER: CPT | Mod: RT

## 2022-08-29 PROCEDURE — 250N000011 HC RX IP 250 OP 636: Performed by: EMERGENCY MEDICINE

## 2022-08-29 PROCEDURE — 96374 THER/PROPH/DIAG INJ IV PUSH: CPT

## 2022-08-29 PROCEDURE — 96375 TX/PRO/DX INJ NEW DRUG ADDON: CPT

## 2022-08-29 RX ORDER — KETOROLAC TROMETHAMINE 15 MG/ML
15 INJECTION, SOLUTION INTRAMUSCULAR; INTRAVENOUS ONCE
Status: COMPLETED | OUTPATIENT
Start: 2022-08-29 | End: 2022-08-29

## 2022-08-29 RX ADMIN — KETOROLAC TROMETHAMINE 15 MG: 15 INJECTION, SOLUTION INTRAMUSCULAR; INTRAVENOUS at 08:44

## 2022-08-29 RX ADMIN — HYDROMORPHONE HYDROCHLORIDE 1 MG: 1 INJECTION, SOLUTION INTRAMUSCULAR; INTRAVENOUS; SUBCUTANEOUS at 10:16

## 2022-08-29 ASSESSMENT — ACTIVITIES OF DAILY LIVING (ADL): ADLS_ACUITY_SCORE: 35

## 2022-08-29 NOTE — ED TRIAGE NOTES
Patient states missed a step and fell, right shoulder pain, history of previous dislocation and believes it is out of place again. Has required sedation in the past for this.

## 2022-08-29 NOTE — DISCHARGE INSTRUCTIONS
Wear the shoulder immobilizer for at least 2 weeks.  May remove for showering.  You should wear this when you are sleeping to avoid dislocating the shoulder once again.

## 2022-08-29 NOTE — Clinical Note
Bella Ricks was seen and treated in our emergency department on 8/29/2022.  She may return to work on 08/31/2022.       If you have any questions or concerns, please don't hesitate to call.      Erik Duff MD

## 2022-08-29 NOTE — ED PROVIDER NOTES
EMERGENCY DEPARTMENT ENCOUNTER      NAME: Bella Ricks  AGE: 66 year old female  YOB: 1955  MRN: [id number]  EVALUATION DATE & TIME: No admission date for patient encounter.    PCP: Allyson Valdes    ED PROVIDER: Erik Duff M.D.      Chief Complaint   Patient presents with     Dislocation         FINAL IMPRESSION:  Right shoulder dislocation      ED COURSE & MEDICAL DECISION MAKING:    Pertinent Labs & Imaging studies reviewed. (See chart for details)  66 year old female presents to the Emergency Department for evaluation of right shoulder pain.  Patient stumbled and fell earlier this morning.  Fell on outstretched arm.  Patient with immediate severe pain in the shoulder.  Worsens with movement.  Feels similar to prior shoulder dislocations.  Step-off at right AC noted.  Patient with arm held in abduction.  IV access to be established.  Toradol given for discomfort.  Patient seen in triage area secondary to ED crowding issues.  Imaging ordered.  Patient made n.p.o. in anticipation of reduction.  Patient appears non toxic with stable vitals signs.       8:12 AM I met with the patient for the initial interview and physical examination. Discussed plan for treatment and workup in the ED.    9:18 AM .  X-ray with anterior shoulder dislocation.  No evidence of obvious fracture.  Results shared with patient.  Still in considerable distress.  Also discussed need for room with charge nurse  10:13 AM I rechecked the patient. She has dislocated her shoulder 3 other times, she just needs pain medications to put it back in place.  Dilaudid 1 mg ordered.  10:37 AM patient shoulder reduced with gentle hyperextension ..  Patient with good range of motion.  Postreduction films ordered.  11:26 AM I recheck and updated the patient that everything looks good. I put her in the shoulder immobilizer. Discussed discharge, patient is agreeable.  at the conclusion of the encounter I discussed the results of all of the  tests and the disposition. The questions were answered and return precautions provided. The patient or family acknowledged understanding and was agreeable with the care plan.       PPE: Provider wore gloves, eye protection, surgical cap, and paper mask.     MEDICATIONS GIVEN IN THE EMERGENCY:  Medications   ketorolac (TORADOL) injection 15 mg (15 mg Intravenous Given 8/29/22 0844)   HYDROmorphone (DILAUDID) injection 1 mg (1 mg Intravenous Given 8/29/22 1016)       NEW PRESCRIPTIONS STARTED AT TODAY'S ER VISIT  New Prescriptions    No medications on file          =================================================================    HPI    Patient information was obtained from: Patient    Use of Intrepreter: N/A        Bella Ricks is a 66 year old female with a pertient medical history of dislocation who presents to the ED for evaluation of right shoulder pain.    Patient reports she missed a step and fell, injuring her right shoulder. She notes a previous dislocation and believes it may be out of place again. No other current complaints.      REVIEW OF SYSTEMS   Constitutional:  Denies fever, chills  Respiratory:  Denies productive cough or increased work of breathing  Cardiovascular:  Denies chest pain, palpitations  GI:  Denies abdominal pain, nausea, vomiting, or change in bowel or bladder habits   Musculoskeletal:  Denies any new muscle/joint swelling. Positive for right shoulder pain.  Skin:  Denies rash   Neurologic:  Denies focal weakness  All systems negative except as marked.     PAST MEDICAL HISTORY:  Past Medical History:   Diagnosis Date     Breast cyst 1994     Osteoporosis        PAST SURGICAL HISTORY:  Past Surgical History:   Procedure Laterality Date     HC REMOVAL OF TONSILS,<13 Y/O      Description: Tonsillectomy;  Recorded: 04/22/2009;     MA STEREOTACTIC BREAST BIOPSY VACUUM L Left 1/11/2021     OOPHORECTOMY Right 1980     ORIF DISTAL RADIUS FRACTURE  03/2018     IA REMOVAL OF OVARY/TUBE(S)       Description: Salpingo-oophorectomy;  Recorded: 2009;  Comments: ? R side     WRIST FRACTURE SURGERY Bilateral 2017    distal radius ORIF         CURRENT MEDICATIONS:    No current facility-administered medications for this encounter.    Current Outpatient Medications:      cholecalciferol 50 MCG (2000 UT) tablet, Take 2,000 Units by mouth, Disp: , Rfl:      cholecalciferol, vitamin D3, 2,000 unit Tab, [CHOLECALCIFEROL, VITAMIN D3, 2,000 UNIT TAB] Take 2,000 Units by mouth every evening., Disp: , Rfl:      sertraline (ZOLOFT) 100 MG tablet, Take 1 tablet (100 mg) by mouth daily, Disp: 90 tablet, Rfl: 3    ALLERGIES:  No Known Allergies    FAMILY HISTORY:  Family History   Problem Relation Age of Onset     Osteoporosis Mother      Aneurysm Father         cause of death, AAA     Aneurysm Paternal Uncle         Cause of Death, AAA     Aneurysm Paternal Grandfather      Dementia Brother         1/2 brother     Osteoporosis Maternal Grandmother        SOCIAL HISTORY:   Social History     Socioeconomic History     Marital status:    Tobacco Use     Smoking status: Former Smoker     Packs/day: 0.50     Years: 40.00     Pack years: 20.00     Start date: 1969     Quit date: 2009     Years since quittin.7     Smokeless tobacco: Never Used   Substance and Sexual Activity     Alcohol use: No     Drug use: No   Social History Narrative    Lives with brother .  Single. 2 adult children and 6 grand kids.  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)    Allyson Valdes MD  2019    The 10-year ASCVD risk score (Bellevilledi MARTIN Jr., et al., 2013) is: 4.3%    Values used  to calculate the score:      Age: 63 years      Sex: Female      Is Non- : No      Diabetic: No      Tobacco smoker: No      Systolic Blood Pressure: 131 mmHg      Is BP treated: No      HDL Cholesterol: 72 mg/dL      Total Choles terol: 213 mg/dL         VITALS:  Patient Vitals for the  past 24 hrs:   BP Pulse Resp SpO2   08/29/22 1045 125/58 57 18 98 %   08/29/22 1030 -- 51 -- --   08/29/22 1015 (!) 161/74 -- -- --        PHYSICAL EXAM    Constitutional:  Awake, alert, in mild distress holding her right arm  HENT:  Normocephalic, Atraumatic. Bilateral external ears normal. Oropharynx moist. Nose normal. Neck- Normal range of motion with no guarding, No midline cervical tenderness, Supple, No stridor.   Eyes:  PERRL, EOMI with no signs of entrapment, Conjunctiva normal, No discharge.   Respiratory:  Normal breath sounds, No respiratory distress, No wheezing.    Cardiovascular:  Normal heart rate, Normal rhythm, No appreciable rubs or gallops.   GI:  Soft, No tenderness, No distension, No palpable masses  Musculoskeletal: No edema.  Decreased range of motion in right shoulder,adducted ,step off at AC joint.  Integument:  Warm, Dry, No erythema, No rash.   Neurologic:  Alert & oriented, Normal motor function, Normal sensory function, No focal deficits noted.   Psychiatric:  Affect normal, Judgment normal, Mood normal.     LAB:  All pertinent labs reviewed and interpreted.  Results for orders placed or performed during the hospital encounter of 08/29/22   XR Shoulder Right 2 Views    Impression    IMPRESSION: Anterior dislocation of the right glenohumeral joint. No evidence for fracture but repeat films following reduction recommended.    XR Shoulder Right 2 Views    Impression    IMPRESSION: Interval reduction of the dislocation of the glenohumeral joint seen on the prior examination. No evidence for fracture. The AC joint is negative.        RADIOLOGY:  Reviewed all pertinent imaging. Please see official radiology report.  XR Shoulder Right 2 Views   Preliminary Result   IMPRESSION: Interval reduction of the dislocation of the glenohumeral joint seen on the prior examination. No evidence for fracture. The AC joint is negative.       XR Shoulder Right 2 Views   Final Result   IMPRESSION: Anterior  dislocation of the right glenohumeral joint. No evidence for fracture but repeat films following reduction recommended.           PROCEDURES:   PROCEDURE: Joint Reduction   INDICATIONS: Anterior dislocation of the right glenohumeral joint.    PROCEDURE PROVIDER: Dr Erik Duff   CONSENT:  Verbal   MEDICATIONS: Dilaudid 1 mg   REDUCTION PROCEDURE DESCRIPTION:  Hyperextension with gentle traction   COMPLICATIONS:  Patient tolerated procedure well, without complication           I, Connie Lovelace, am serving as a scribe to document services personally performed by Erik Duff MD, based on my observation and the provider's statements to me. I, Erik Duff MD attest that Connie Lovelace is acting in a scribe capacity, has observed my performance of the services and has documented them in accordance with my direction.    Erik Duff M.D.  Emergency Medicine  [facility] EMERGENCY DEPARTMENT     Erik Duff MD  08/29/22 1129       Erik Duff MD  09/06/22 0811

## 2022-08-30 ENCOUNTER — TRANSFERRED RECORDS (OUTPATIENT)
Dept: HEALTH INFORMATION MANAGEMENT | Facility: CLINIC | Age: 67
End: 2022-08-30

## 2022-08-31 ENCOUNTER — APPOINTMENT (OUTPATIENT)
Dept: RADIOLOGY | Facility: HOSPITAL | Age: 67
End: 2022-08-31
Attending: EMERGENCY MEDICINE
Payer: OTHER MISCELLANEOUS

## 2022-08-31 ENCOUNTER — HOSPITAL ENCOUNTER (EMERGENCY)
Facility: HOSPITAL | Age: 67
Discharge: HOME | End: 2022-08-31
Attending: EMERGENCY MEDICINE | Admitting: EMERGENCY MEDICINE
Payer: OTHER MISCELLANEOUS

## 2022-08-31 VITALS
BODY MASS INDEX: 25.83 KG/M2 | RESPIRATION RATE: 16 BRPM | WEIGHT: 155 LBS | HEART RATE: 62 BPM | SYSTOLIC BLOOD PRESSURE: 144 MMHG | TEMPERATURE: 98 F | DIASTOLIC BLOOD PRESSURE: 69 MMHG | HEIGHT: 65 IN | OXYGEN SATURATION: 98 %

## 2022-08-31 DIAGNOSIS — S43.004A SHOULDER DISLOCATION, RIGHT, INITIAL ENCOUNTER: ICD-10-CM

## 2022-08-31 PROCEDURE — 250N000011 HC RX IP 250 OP 636: Performed by: EMERGENCY MEDICINE

## 2022-08-31 PROCEDURE — 999N000055 HC STATISTIC END TITIAL CO2 MONITORING

## 2022-08-31 PROCEDURE — 23650 CLTX SHO DSLC W/MNPJ WO ANES: CPT | Mod: RT

## 2022-08-31 PROCEDURE — 96375 TX/PRO/DX INJ NEW DRUG ADDON: CPT

## 2022-08-31 PROCEDURE — 73030 X-RAY EXAM OF SHOULDER: CPT | Mod: RT

## 2022-08-31 PROCEDURE — 96374 THER/PROPH/DIAG INJ IV PUSH: CPT

## 2022-08-31 PROCEDURE — 999N000157 HC STATISTIC RCP TIME EA 10 MIN

## 2022-08-31 PROCEDURE — 96376 TX/PRO/DX INJ SAME DRUG ADON: CPT

## 2022-08-31 PROCEDURE — 999N000065 XR SHOULDER RIGHT G/E 3 VIEWS

## 2022-08-31 PROCEDURE — 99152 MOD SED SAME PHYS/QHP 5/>YRS: CPT

## 2022-08-31 PROCEDURE — 99285 EMERGENCY DEPT VISIT HI MDM: CPT | Mod: 25

## 2022-08-31 RX ORDER — ONDANSETRON 2 MG/ML
4 INJECTION INTRAMUSCULAR; INTRAVENOUS EVERY 30 MIN PRN
Status: DISCONTINUED | OUTPATIENT
Start: 2022-08-31 | End: 2022-08-31 | Stop reason: HOSPADM

## 2022-08-31 RX ORDER — PROPOFOL 10 MG/ML
50 INJECTION, EMULSION INTRAVENOUS ONCE
Status: COMPLETED | OUTPATIENT
Start: 2022-08-31 | End: 2022-08-31

## 2022-08-31 RX ORDER — KETOROLAC TROMETHAMINE 15 MG/ML
15 INJECTION, SOLUTION INTRAMUSCULAR; INTRAVENOUS ONCE
Status: COMPLETED | OUTPATIENT
Start: 2022-08-31 | End: 2022-08-31

## 2022-08-31 RX ADMIN — HYDROMORPHONE HYDROCHLORIDE 0.5 MG: 1 INJECTION, SOLUTION INTRAMUSCULAR; INTRAVENOUS; SUBCUTANEOUS at 04:05

## 2022-08-31 RX ADMIN — HYDROMORPHONE HYDROCHLORIDE 1 MG: 1 INJECTION, SOLUTION INTRAMUSCULAR; INTRAVENOUS; SUBCUTANEOUS at 04:46

## 2022-08-31 RX ADMIN — PROPOFOL 40 MG: 10 INJECTION, EMULSION INTRAVENOUS at 05:32

## 2022-08-31 RX ADMIN — KETOROLAC TROMETHAMINE 15 MG: 15 INJECTION, SOLUTION INTRAMUSCULAR; INTRAVENOUS at 05:46

## 2022-08-31 RX ADMIN — ONDANSETRON 4 MG: 2 INJECTION INTRAMUSCULAR; INTRAVENOUS at 04:05

## 2022-08-31 RX ADMIN — ONDANSETRON 4 MG: 2 INJECTION INTRAMUSCULAR; INTRAVENOUS at 04:47

## 2022-08-31 ASSESSMENT — ACTIVITIES OF DAILY LIVING (ADL)
ADLS_ACUITY_SCORE: 35
ADLS_ACUITY_SCORE: 35

## 2022-08-31 NOTE — ED TRIAGE NOTES
Patient here stating she dislocated her right shoulder after falling a few days ago. Has been wearing a sling. Dislocated her shoulder this morning when she took off the sling and was pulling up her pants.      Triage Assessment     Row Name 08/31/22 0344       Triage Assessment (Adult)    Airway WDL WDL       Respiratory WDL    Respiratory WDL WDL       Skin Circulation/Temperature WDL    Skin Circulation/Temperature WDL WDL       Cardiac WDL    Cardiac WDL WDL       Peripheral/Neurovascular WDL    Peripheral Neurovascular WDL WDL       Cognitive/Neuro/Behavioral WDL    Cognitive/Neuro/Behavioral WDL WDL

## 2022-08-31 NOTE — ED NOTES
EMERGENCY DEPARTMENT ENCOUNTER      NAME: Bella Ricks  AGE: 66 year old female  YOB: 1955  MRN: [id number]  EVALUATION DATE & TIME: 2022  3:51 AM    PCP: Allyson Valdes    ED PROVIDER: Terri Angel M.D.        Chief Complaint   Patient presents with     dislocated shoulder         FINAL IMPRESSION:    1. Shoulder dislocation, right, initial encounter          Patient was signed out to me at change of shift by Dr. Brito at 6:59 AM. Please see their note for full HPI, exam and plan.    MEDICAL DECISION MAKIN year old female with history of recurrent shoulder dislocations who presents emergency department with another shoulder dislocation.    She recently was seen in the ER for shoulder dislocation that was reduced.  Last night she was removing her sling when at rehab dislocated.  In the interim she has followed up with orthopedics.  Shoulder was reduced by my partner earlier this morning and patient signed out at change of shift just awaiting her palpable sedation to wear off and patient will be discharged home with a ride.    Patient is now awake, walking around the department, has had some liquids to drink and is feeling fine.  She will be discharged home once her ride arrives.        ED COURSE:  6:59 AM  Patient was signed out to me at change of shift by Dr. Brito. Please see their note for full HPI, exam and plan.    7:11 AM  Pt has been up and walking with nursing, drinking and feels ready to leave. We will have her call for a ride home.    7:53 AM  Patient completely awake and appropriate for discharge.  She states her ride is on the way.  She is aware that she needs to call some orthopedics for ongoing follow-up and agrees.  She declines the need for pain medications for home use.      COVID-19 PPE worn during patient evaluation:  Mask: n95 and homemade masks   Eye Protection: goggles   Gown: none   Hair cover: yes  Face shield: none   Patient wearing a mask: yes          At the conclusion of the encounter I discussed the results of all of the tests and the disposition. Their questions were answered. The patient (and any family present) acknowledged understanding and were agreeable with the care plan.        CONSULTANTS:  none        MEDICATIONS GIVEN IN THE EMERGENCY:  Medications   ondansetron (ZOFRAN) injection 4 mg (4 mg Intravenous Given 8/31/22 0447)   HYDROmorphone (DILAUDID) injection 0.5 mg (0.5 mg Intravenous Given 8/31/22 6505)   HYDROmorphone (DILAUDID) injection 1 mg (1 mg Intravenous Given 8/31/22 6936)   propofol (DIPRIVAN) injection 10 mg/mL vial (40 mg Intravenous Given 8/31/22 0532)   ketorolac (TORADOL) injection 15 mg (15 mg Intravenous Given 8/31/22 0593)         NEW PRESCRIPTIONS STARTED AT TODAY'S ER VISIT     Medication List      There are no discharge medications for this visit.           CONDITION:  Stable, improved        DISPOSITION:  discharge home with ride         =================================================================  =================================================================      Patient was signed out to me at change of shift by Dr. Brito at 6:59 AM. Please see their note for full HPI, exam and plan.        HPI    Bella Ricks is a 66 year old female with history of recurrent shoulder dislocations who presents to the ER with complaints of shoulder dislocation. This has been reduced and signed out to me at change of shift awaiting her sedation to wear off.     She recently saw [facility] for these shoulder dislocations and last night she went to take her sling off (recent shoulder dislocation s/ reduction) and it dislocated again.      PAST MEDICAL HISTORY:  Past Medical History:   Diagnosis Date     Breast cyst 1994     Osteoporosis          PAST SURGICAL HISTORY:  Past Surgical History:   Procedure Laterality Date     HC REMOVAL OF TONSILS,<11 Y/O      Description: Tonsillectomy;  Recorded: 04/22/2009;     MA  STEREOTACTIC BREAST BIOPSY VACUUM L Left 2021     OOPHORECTOMY Right      ORIF DISTAL RADIUS FRACTURE  2018     MS REMOVAL OF OVARY/TUBE(S)      Description: Salpingo-oophorectomy;  Recorded: 2009;  Comments: ? R side     WRIST FRACTURE SURGERY Bilateral 2017    distal radius ORIF         CURRENT MEDICATIONS:    Prior to Admission medications    Medication Sig Start Date End Date Taking? Authorizing Provider   cholecalciferol 50 MCG (2000 UT) tablet Take 2,000 Units by mouth    Reported, Patient   cholecalciferol, vitamin D3, 2,000 unit Tab [CHOLECALCIFEROL, VITAMIN D3, 2,000 UNIT TAB] Take 2,000 Units by mouth every evening. 17   Provider, Historical   sertraline (ZOLOFT) 100 MG tablet Take 1 tablet (100 mg) by mouth daily 22   Allyson Valdes MD         ALLERGIES:  No Known Allergies      FAMILY HISTORY:  Family History   Problem Relation Age of Onset     Osteoporosis Mother      Aneurysm Father         cause of death, AAA     Aneurysm Paternal Uncle         Cause of Death, AAA     Aneurysm Paternal Grandfather      Dementia Brother         1/2 brother     Osteoporosis Maternal Grandmother          SOCIAL HISTORY:  Social History     Socioeconomic History     Marital status:      Spouse name: None     Number of children: None     Years of education: None     Highest education level: None   Tobacco Use     Smoking status: Former Smoker     Packs/day: 0.50     Years: 40.00     Pack years: 20.00     Start date: 1969     Quit date: 2009     Years since quittin.7     Smokeless tobacco: Never Used   Substance and Sexual Activity     Alcohol use: No     Drug use: No   Social History Narrative    Lives with brother .  Single. 2 adult children and 6 grand kids.  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)    Allyson Valdes MD  2019    The 10-year ASCVD risk score (Kamiahdi MARTIN Jr., et al., 2013) is: 4.3%    Values used  to calculate the  "score:      Age: 63 years      Sex: Female      Is Non- : No      Diabetic: No      Tobacco smoker: No      Systolic Blood Pressure: 131 mmHg      Is BP treated: No      HDL Cholesterol: 72 mg/dL      Total Choles terol: 213 mg/dL           VITALS:  Patient Vitals for the past 24 hrs:   BP Temp Temp src Pulse Resp SpO2 Height Weight   08/31/22 0700 118/59 -- -- 65 24 98 % -- --   08/31/22 0645 118/57 -- -- 58 20 98 % -- --   08/31/22 0632 -- -- -- -- -- (!) 87 % -- --   08/31/22 0630 123/57 -- -- 58 24 94 % -- --   08/31/22 0629 -- -- -- -- (!) 32 -- -- --   08/31/22 0615 129/63 -- -- 55 18 98 % -- --   08/31/22 0602 -- -- -- -- -- 97 % -- --   08/31/22 0600 125/59 -- -- 63 18 (!) 87 % -- --   08/31/22 0545 131/73 -- -- 57 24 95 % -- --   08/31/22 0544 -- -- -- 54 16 97 % -- --   08/31/22 0540 130/64 -- -- 58 22 96 % -- --   08/31/22 0536 -- -- -- -- 14 -- -- --   08/31/22 0536 -- -- -- 59 14 97 % -- --   08/31/22 0517 139/63 98  F (36.7  C) Oral 64 18 98 % -- --   08/31/22 0343 (!) 174/80 98.1  F (36.7  C) Temporal 69 18 98 % 1.651 m (5' 5\") 70.3 kg (155 lb)       Wt Readings from Last 3 Encounters:   08/31/22 70.3 kg (155 lb)   06/24/22 70.3 kg (155 lb)   12/15/20 69.2 kg (152 lb 9.6 oz)         PHYSICAL EXAM    Please see initial providers note for detailed physical exam        LAB:  All pertinent labs reviewed and interpreted.  No results found for this or any previous visit (from the past 24 hour(s)).    No results found for: ABORH        RADIOLOGY:  Reviewed all pertinent imaging. Please see official radiology report.    XR Shoulder Right 3 Views   Final Result   IMPRESSION: Interval reduction of right shoulder dislocation.       XR Shoulder Right 3 Views   Final Result   IMPRESSION: Anterior dislocation of the humeral head. No acute fracture.             EKG:    none    PROCEDURES:  S/p shoulder reduction by Dr. Daryl GOETZ, Tobias Dumont, am serving as a scribe to document services " personally performed by Dr. Terri Angel based on my observation and the provider's statements to me. I, Dr. Terri Angel MD attest that Tobias Dumont is acting in a scribe capacity, has observed my performance of the services and has documented them in accordance with my direction.        Terri Angel M.D. [facility]  Emergency Medicine and Medical Toxicology  [facility] EMERGENCY DEPARTMENT  [address] [place] [id number] [phone]  Dept: [phone]         Terri Angel MD  08/31/22 0754

## 2022-08-31 NOTE — DISCHARGE INSTRUCTIONS
Continue to work with [facility] for your shoulder dislocations.    Return to the ER if you develop worse pain, recurrent dislocation, cold or numb fingers, or any other concerns.    Thank you for choosing [facility].  It has been our pleasure caring for you today.

## 2022-08-31 NOTE — ED PROVIDER NOTES
NAME: Bella Ricks  AGE: 66 year old female  YOB: 1955  MRN: [id number]  EVALUATION DATE & TIME: No admission date for patient encounter.    PCP: Allyson Valdes    ED PROVIDER: Mariano Brito M.D.      Chief Complaint   Patient presents with     dislocated shoulder         FINAL IMPRESSION:  1. Shoulder dislocation, right, initial encounter        MEDICAL DECISION MAKIN:02 AM I met with the patient, obtained history, performed an initial exam, and discussed options and plan for diagnostics and treatment here in the ED.   Patient was clinically assessed and consented to treatment. After assessment, medical decision making and workup were discussed with the patient. The patient was agreeable to plan for testing, workup, and treatment.  4:49 AM The right shoulder dislocation reduction procedure was unsuccessful without sedation. Will attempt procedure with sedation.  5:37 AM Successful dislocation reduction procedure.  Pertinent Labs & Imaging studies reviewed. (See chart for details)     Bella Ricks is a 66 year old female who presents with dislocated shoulder.   Differential diagnosis includes but not limited to right shoulder dislocation, right humeral fracture, glenoid fracture, clavicle fracture.  Patient with prior dislocations and just was reduced 2 days ago.  Patient with repeat dislocation on taking her sling off.  Shoulder felt deformed and dislocated will check with x-ray.  X-ray did confirm dislocation anteriorly.  Patient was given pain medication and attempt to reduce with pain medication and gentle inferior traction with lateral rotation were unsuccessful.  Patient was then consented for conscious sedation and reduction.  Following successful conscious sedation reduction was achieved.  This was confirmed with x-ray and no signs of any fractures.  Patient will be plan for discharge and monitor for clearance from sedation before discharge.    0 minutes of critical care  "time    MEDICATIONS GIVEN IN THE EMERGENCY:  Medications   ondansetron (ZOFRAN) injection 4 mg (4 mg Intravenous Given 8/31/22 0447)   HYDROmorphone (DILAUDID) injection 0.5 mg (0.5 mg Intravenous Given 8/31/22 0405)   HYDROmorphone (DILAUDID) injection 1 mg (1 mg Intravenous Given 8/31/22 6826)   propofol (DIPRIVAN) injection 10 mg/mL vial (40 mg Intravenous Given 8/31/22 6532)   ketorolac (TORADOL) injection 15 mg (15 mg Intravenous Given 8/31/22 0546)       NEW PRESCRIPTIONS STARTED AT TODAY'S ER VISIT:  New Prescriptions    No medications on file          =================================================================    HPI    Patient information was obtained from: Patient and triage note    Use of : N/A      Bella Ricks is a 66 year old female with a past medical history of osteoporosis and recurrent dislocation of shoulder who presents to the ED via private car for evaluation of a dislocated shoulder.    Per triage note, the patient reports dislocating her right shoulder after falling a couple days ago. Since the dislocation she has been wearing a sling, but this morning, she had the sling off and was pulling up her pants, when she dislocated her shoulder again.    Per patient, she reports that she did not fall, bu that her right shoulder \"slippd out.\" Patient drove here. No other complaints at this time.    REVIEW OF SYSTEMS   Review of Systems   Musculoskeletal:        Positive for right shoulder dislocatioin   All other systems reviewed and are negative.       PAST MEDICAL HISTORY:  Past Medical History:   Diagnosis Date     Breast cyst 1994     Osteoporosis        PAST SURGICAL HISTORY:  Past Surgical History:   Procedure Laterality Date     HC REMOVAL OF TONSILS,<11 Y/O      Description: Tonsillectomy;  Recorded: 04/22/2009;     MA STEREOTACTIC BREAST BIOPSY VACUUM L Left 1/11/2021     OOPHORECTOMY Right 1980     ORIF DISTAL RADIUS FRACTURE  03/2018     CT REMOVAL OF OVARY/TUBE(S)      " Description: Salpingo-oophorectomy;  Recorded: 2009;  Comments: ? R side     WRIST FRACTURE SURGERY Bilateral 2017    distal radius ORIF       CURRENT MEDICATIONS:      Current Facility-Administered Medications:      ondansetron (ZOFRAN) injection 4 mg, 4 mg, Intravenous, Q30 Min PRN, Mariano Brito MD, 4 mg at 22 1915    Current Outpatient Medications:      cholecalciferol 50 MCG (2000 UT) tablet, Take 2,000 Units by mouth, Disp: , Rfl:      cholecalciferol, vitamin D3, 2,000 unit Tab, [CHOLECALCIFEROL, VITAMIN D3, 2,000 UNIT TAB] Take 2,000 Units by mouth every evening., Disp: , Rfl:      sertraline (ZOLOFT) 100 MG tablet, Take 1 tablet (100 mg) by mouth daily, Disp: 90 tablet, Rfl: 3    ALLERGIES:  No Known Allergies    FAMILY HISTORY:  Family History   Problem Relation Age of Onset     Osteoporosis Mother      Aneurysm Father         cause of death, AAA     Aneurysm Paternal Uncle         Cause of Death, AAA     Aneurysm Paternal Grandfather      Dementia Brother         1/2 brother     Osteoporosis Maternal Grandmother        SOCIAL HISTORY:   Social History     Socioeconomic History     Marital status:    Tobacco Use     Smoking status: Former Smoker     Packs/day: 0.50     Years: 40.00     Pack years: 20.00     Start date: 1969     Quit date: 2009     Years since quittin.7     Smokeless tobacco: Never Used   Substance and Sexual Activity     Alcohol use: No     Drug use: No   Social History Narrative    Lives with brother .  Single. 2 adult children and 6 grand kids.  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   Constitutional:       General: She is not in acute distress.     Appearance: Normal appearance. She is normal weight. She is not ill-appearing or toxic-appearing.   HENT:      Head: Normocephalic and atraumatic.   Cardiovascular:      Rate and Rhythm: Normal rate and regular rhythm.      Heart sounds: Normal heart sounds.   Pulmonary:      Effort: Pulmonary effort is normal. No respiratory distress.      Breath sounds: Normal breath sounds.   Musculoskeletal:         General: Tenderness, deformity and signs of injury present.      Right shoulder: Deformity, tenderness and bony tenderness present.        Arms:       Cervical back: Normal range of motion and neck supple.   Skin:     General: Skin is warm and dry.      Capillary Refill: Capillary refill takes less than 2 seconds.      Coloration: Skin is not pale.   Neurological:      General: No focal deficit present.      Mental Status: She is alert and oriented to person, place, and time.      Sensory: No sensory deficit.   Psychiatric:         Behavior: Behavior normal.           LAB:  All pertinent labs reviewed and interpreted.  Labs Ordered and Resulted from Time of ED Arrival to Time of ED Departure - No data to display    RADIOLOGY:  XR Shoulder Right 3 Views   Final Result   IMPRESSION: Interval reduction of right shoulder dislocation.       XR Shoulder Right 3 Views   Final Result   IMPRESSION: Anterior dislocation of the humeral head. No acute fracture.         PROCEDURES:   [facility]    Procedure: Right shoulder reduction    Date/Time: 8/31/2022 5:39 AM  Performed by: Mariano Brito MD  Authorized by: Mariano Brito MD "     Risks, benefits and alternatives discussed.    ED EVALUATION:      Assessment Time: 8/31/2022 5:04 AM      I have performed an Emergency Department Evaluation including taking a history and physical examination, this evaluation will be documented in the electronic medical record for this ED encounter.     Indication: Right shoulder dislocation    ASA Class: Class 2- mild systemic disease, no acute problems, no functional limitations    Mallampati: Grade 2- soft palate, base of uvula, tonsillar pillars, and portion of posterior pharyngeal wall visible    NPO Status: appropriately NPO for procedure    UNIVERSAL PROTOCOL   Site Marked: Yes  Prior Images Obtained and Reviewed:  Yes  Required items: Required blood products, implants, devices and special equipment available    Patient identity confirmed:  Verbally with patient and arm band  Patient was reevaluated immediately before administering moderate or deep sedation or anesthesia  Confirmation Checklist:  Patient's identity using two indicators, relevant allergies, procedure was appropriate and matched the consent or emergent situation and correct equipment/implants were available  Time out: Immediately prior to the procedure a time out was called    Universal Protocol: the Joint Commission Universal Protocol was followed    Preparation: Patient was prepped and draped in usual sterile fashion      SEDATION  Patient Sedated: Yes    Sedation Type:  Moderate (conscious) sedation  Sedation:  See MAR for details and propofol  Vital signs: Vital signs monitored during sedation      PROCEDURE  Describe Procedure: Sedation was maintained until the procedure was complete. The patient was monitored by staff until recovered.  Patient Tolerance:  Patient tolerated the procedure well with no immediate complications  Length of time physician/provider present for 1:1 monitoring during sedation: 10         I, Kim Sanchez am serving as a scribe to document services  personally performed by Dr. Mariano Brito  based on my observation and the provider's statements to me. I, Mariano Brito MD attest that Kim Sanchez is acting in a scribe capacity, has observed my performance of the services and has documented them in accordance with my direction.      Mariano Brito M.D.  Emergency Medicine  [facility] Emergency Department       Mariano Brito MD  08/31/22 0614

## 2022-08-31 NOTE — ED NOTES
ER DISCHARGE NOTE:     Bella Ricks  MRN: [id number]  (S43.004A) Shoulder dislocation, right, initial encounter        Bella Ricks discharged via on foot with friend.    Verbalized understanding.    AVS in hand.         08/31/22 0820   Departure Condition   Departure Condition Stable   Mobility at Departure Ambulatory   Departure Mode By self;With friend   Vital Signs   BP (!) 144/69   BP - Mean 99   Patient Position Lying   Pulse 62   Pulse Rate Source Monitor   Resp 16   SpO2 98 %   O2 Device None (Room air)   Capillary Refill, General less than/equal to 3 secs   Erica Coma Scale   Best Eye Response 4-->(E4) spontaneous   Best Motor Response 6-->(M6) obeys commands   Best Verbal Response 5-->(V5) oriented   Erica Coma Scale Score 15   RASS (Collier Agitation-Sedation Scale)   RASS (Collier Agitation-Sedation Scale) 0-->alert and calm

## 2022-09-06 ENCOUNTER — TRANSFERRED RECORDS (OUTPATIENT)
Dept: HEALTH INFORMATION MANAGEMENT | Facility: CLINIC | Age: 67
End: 2022-09-06

## 2022-09-28 ENCOUNTER — TRANSFERRED RECORDS (OUTPATIENT)
Dept: HEALTH INFORMATION MANAGEMENT | Facility: CLINIC | Age: 67
End: 2022-09-28

## 2022-09-30 ENCOUNTER — TRANSFERRED RECORDS (OUTPATIENT)
Dept: HEALTH INFORMATION MANAGEMENT | Facility: CLINIC | Age: 67
End: 2022-09-30

## 2022-10-01 ENCOUNTER — HEALTH MAINTENANCE LETTER (OUTPATIENT)
Age: 67
End: 2022-10-01

## 2022-10-19 ENCOUNTER — OFFICE VISIT (OUTPATIENT)
Dept: FAMILY MEDICINE | Facility: CLINIC | Age: 67
End: 2022-10-19
Payer: OTHER MISCELLANEOUS

## 2022-10-19 VITALS
OXYGEN SATURATION: 96 % | SYSTOLIC BLOOD PRESSURE: 130 MMHG | HEIGHT: 64 IN | TEMPERATURE: 97.8 F | HEART RATE: 72 BPM | WEIGHT: 152.2 LBS | DIASTOLIC BLOOD PRESSURE: 84 MMHG | BODY MASS INDEX: 25.99 KG/M2

## 2022-10-19 DIAGNOSIS — Z01.818 PREOP GENERAL PHYSICAL EXAM: Primary | ICD-10-CM

## 2022-10-19 DIAGNOSIS — R79.89 ELEVATED SERUM CREATININE: ICD-10-CM

## 2022-10-19 LAB
ALBUMIN SERPL BCG-MCNC: 4 G/DL (ref 3.5–5.2)
ALP SERPL-CCNC: 76 U/L (ref 35–104)
ALT SERPL W P-5'-P-CCNC: 15 U/L (ref 10–35)
ANION GAP SERPL CALCULATED.3IONS-SCNC: 11 MMOL/L (ref 7–15)
AST SERPL W P-5'-P-CCNC: 23 U/L (ref 10–35)
BILIRUB SERPL-MCNC: 0.2 MG/DL
BUN SERPL-MCNC: 17.3 MG/DL (ref 8–23)
CALCIUM SERPL-MCNC: 9.8 MG/DL (ref 8.8–10.2)
CHLORIDE SERPL-SCNC: 102 MMOL/L (ref 98–107)
CREAT SERPL-MCNC: 0.95 MG/DL (ref 0.51–0.95)
DEPRECATED HCO3 PLAS-SCNC: 25 MMOL/L (ref 22–29)
ERYTHROCYTE [DISTWIDTH] IN BLOOD BY AUTOMATED COUNT: 13.9 % (ref 10–15)
GFR SERPL CREATININE-BSD FRML MDRD: 66 ML/MIN/1.73M2
GLUCOSE SERPL-MCNC: 88 MG/DL (ref 70–99)
HCT VFR BLD AUTO: 45.8 % (ref 35–47)
HGB BLD-MCNC: 15 G/DL (ref 11.7–15.7)
MCH RBC QN AUTO: 28.6 PG (ref 26.5–33)
MCHC RBC AUTO-ENTMCNC: 32.8 G/DL (ref 31.5–36.5)
MCV RBC AUTO: 87 FL (ref 78–100)
PLATELET # BLD AUTO: 266 10E3/UL (ref 150–450)
POTASSIUM SERPL-SCNC: 4.6 MMOL/L (ref 3.4–5.3)
PROT SERPL-MCNC: 6.7 G/DL (ref 6.4–8.3)
RBC # BLD AUTO: 5.24 10E6/UL (ref 3.8–5.2)
SODIUM SERPL-SCNC: 138 MMOL/L (ref 136–145)
WBC # BLD AUTO: 7.5 10E3/UL (ref 4–11)

## 2022-10-19 PROCEDURE — 85027 COMPLETE CBC AUTOMATED: CPT | Performed by: FAMILY MEDICINE

## 2022-10-19 PROCEDURE — 80053 COMPREHEN METABOLIC PANEL: CPT | Performed by: FAMILY MEDICINE

## 2022-10-19 PROCEDURE — 99214 OFFICE O/P EST MOD 30 MIN: CPT | Performed by: FAMILY MEDICINE

## 2022-10-19 PROCEDURE — 36415 COLL VENOUS BLD VENIPUNCTURE: CPT | Performed by: FAMILY MEDICINE

## 2022-10-19 ASSESSMENT — PAIN SCALES - GENERAL: PAINLEVEL: NO PAIN (0)

## 2022-10-19 NOTE — PROGRESS NOTES
[facility]  [address] [place] [address] [address] [facility] [employer] [facility] [address]  [facility] [address]  Phone: [phone]  Fax: [phone]  Primary Provider: Allyson Valdes  Pre-op Performing Provider: TONI BIRMINGHAM      PREOPERATIVE EVALUATION:  Today's date: 10/19/2022    Bella Ricks is a 66 year old female who presents for a preoperative evaluation.    Surgical Information:  Surgery/Procedure: Shoulder Surgery   Surgery Location: [facility]   Surgeon: DR Nicole  Surgery Date: 10/24  Time of Surgery: 9:00am   Where patient plans to recover: At home with family  Fax number for surgical facility: [phone]    Type of Anesthesia Anticipated: Choice    Assessment & Plan     The proposed surgical procedure is considered LOW risk.    Preop general physical exam  Patient is scheduled for debridement of her right shoulder and right labrum and possible Putti Marilyn type of procedure because of recurrent dislocations  - CBC with platelets  - Comprehensive metabolic panel                   RECOMMENDATION:  APPROVAL GIVEN to proceed with proposed procedure, without further diagnostic evaluation.                      Subjective     HPI related to upcoming procedure: Patient has been in good health and other than a couple of wrist surgeries related to a fall has tolerated general anesthesia in the past.  She has been experiencing recurrent dislocations of her right shoulder and some pain; work-up by orthopedics of revealed cartilage damage and probable intact ligaments she is scheduled for laparoscopic debridement and stabilization of right shoulder under choice or general anesthesia.  All medical questions asked were answered I did answer some primary care questions for her regarding recovery occupational therapy physical therapy.  He has been therapeutically optimized for anticipated procedure.  Very nice lady.  Postoperative pain management will be done by orthopedic surgery      Health Care Directive:  Patient  does not have a Health Care Directive or Living Will:     Preoperative Review of :   reviewed - no record of controlled substances prescribed.          Review of Systems  CONSTITUTIONAL: NEGATIVE for fever, chills, change in weight  INTEGUMENTARY/SKIN: NEGATIVE for worrisome rashes, moles or lesions  EYES: NEGATIVE for vision changes or irritation  ENT/MOUTH: NEGATIVE for ear, mouth and throat problems  RESP: NEGATIVE for significant cough or SOB  CV: NEGATIVE for chest pain, palpitations or peripheral edema  GI: NEGATIVE for nausea, abdominal pain, heartburn, or change in bowel habits  : NEGATIVE for frequency, dysuria, or hematuria  MUSCULOSKELETAL: NEGATIVE for significant arthralgias or myalgia  NEURO: NEGATIVE for weakness, dizziness or paresthesias  ENDOCRINE: NEGATIVE for temperature intolerance, skin/hair changes  HEME: NEGATIVE for bleeding problems  PSYCHIATRIC: NEGATIVE for changes in mood or affect    Patient Active Problem List    Diagnosis Date Noted     Shoulder dislocation, right, initial encounter 08/29/2022     Priority: Medium     Recurrent major depression in complete remission (H) 06/24/2022     Priority: Medium     Major Depression, Recurrent      Priority: Medium     Created by Conversion  Replacement Utility updated for latest IMO load         Osteoporosis      Priority: Medium     Created by Conversion  [facility] Annotation: Jun 28 2009 10:51AM Radha Hinkle: premature  Replacement Utility updated for latest IMO load         Recurrent Dislocation Of The Shoulder Region      Priority: Medium     Created by Conversion  [facility] Annotation: Apr 9 2013  4:02PM Radha Hinkle: RIGHT  Replacement Utility updated for latest IMO load         External hemorrhoid 01/18/2016     Priority: Medium      Past Medical History:   Diagnosis Date     Breast cyst 1994     Osteoporosis      Past Surgical History:   Procedure Laterality Date     HC REMOVAL OF TONSILS,<11 Y/O      Description:  Tonsillectomy;  Recorded: 2009;     MA STEREOTACTIC BREAST BIOPSY VACUUM L Left 2021     OOPHORECTOMY Right      ORIF DISTAL RADIUS FRACTURE  2018     GA REMOVAL OF OVARY/TUBE(S)      Description: Salpingo-oophorectomy;  Recorded: 2009;  Comments: ? R side     WRIST FRACTURE SURGERY Bilateral 2017    distal radius ORIF     Current Outpatient Medications   Medication Sig Dispense Refill     cholecalciferol 50 MCG (2000 UT) tablet Take 2,000 Units by mouth       cholecalciferol, vitamin D3, 2,000 unit Tab [CHOLECALCIFEROL, VITAMIN D3, 2,000 UNIT TAB] Take 2,000 Units by mouth every evening.       sertraline (ZOLOFT) 100 MG tablet Take 1 tablet (100 mg) by mouth daily 90 tablet 3       No Known Allergies     Social History     Tobacco Use     Smoking status: Former     Packs/day: 0.50     Years: 40.00     Pack years: 20.00     Types: Cigarettes     Start date: 1969     Quit date: 2009     Years since quittin.8     Smokeless tobacco: Never   Substance Use Topics     Alcohol use: No       History   Drug Use No         Objective     There were no vitals taken for this visit.    Physical Exam    GENERAL APPEARANCE: healthy, alert and no distress     EYES: EOMI, PERRL     HENT: ear canals and TM's normal and nose and mouth without ulcers or lesions     NECK: no adenopathy, no asymmetry, masses, or scars and thyroid normal to palpation     RESP: lungs clear to auscultation - no rales, rhonchi or wheezes     CV: regular rates and rhythm, normal S1 S2, no S3 or S4 and no murmur, click or rub     ABDOMEN:  soft, nontender, no HSM or masses and bowel sounds normal     MS: extremities normal- no gross deformities noted, no evidence of inflammation in joints, FROM in all extremities.     SKIN: no suspicious lesions or rashes     NEURO: Normal strength and tone, sensory exam grossly normal, mentation intact and speech normal     PSYCH: mentation appears normal. and affect normal/bright      LYMPHATICS: No cervical adenopathy    Recent Labs   Lab Test 06/29/22  0720   HGB 14.5         POTASSIUM 4.9   CR 1.02*        Diagnostics:  Labs pending at this time.  Results will be reviewed when available.   No EKG required for low risk surgery (cataract, skin procedure, breast biopsy, etc).    Revised Cardiac Risk Index (RCRI):  The patient has the following serious cardiovascular risks for perioperative complications:   - No serious cardiac risks = 0 points     RCRI Interpretation: 0 points: Class I (very low risk - 0.4% complication rate)           Signed Electronically by: Landon Marrufo MD  Copy of this evaluation report is provided to requesting physician.

## 2022-10-24 ENCOUNTER — TRANSFERRED RECORDS (OUTPATIENT)
Dept: HEALTH INFORMATION MANAGEMENT | Facility: CLINIC | Age: 67
End: 2022-10-24

## 2022-11-07 ENCOUNTER — TRANSFERRED RECORDS (OUTPATIENT)
Dept: HEALTH INFORMATION MANAGEMENT | Facility: CLINIC | Age: 67
End: 2022-11-07

## 2022-12-06 ENCOUNTER — TRANSFERRED RECORDS (OUTPATIENT)
Dept: HEALTH INFORMATION MANAGEMENT | Facility: CLINIC | Age: 67
End: 2022-12-06

## 2023-01-17 ENCOUNTER — TRANSFERRED RECORDS (OUTPATIENT)
Dept: HEALTH INFORMATION MANAGEMENT | Facility: CLINIC | Age: 68
End: 2023-01-17

## 2023-02-17 ENCOUNTER — HOSPITAL ENCOUNTER (OUTPATIENT)
Age: 68
Discharge: HOME | End: 2023-02-17
Payer: MEDICARE

## 2023-02-17 ENCOUNTER — HOSPITAL ENCOUNTER (OUTPATIENT)
Dept: GENERAL RADIOLOGY | Age: 68
Discharge: HOME | End: 2023-02-17
Payer: MEDICARE

## 2023-02-17 DIAGNOSIS — R53.83 OTHER FATIGUE: ICD-10-CM

## 2023-02-17 DIAGNOSIS — R05.9 COMPLAINING OF COUGH: ICD-10-CM

## 2023-02-17 LAB
ALBUMIN SERPL-MCNC: 5 GM/DL (ref 3.4–5)
ALP BLD-CCNC: 115 IU/L (ref 40–128)
ALT SERPL-CCNC: 31 U/L (ref 10–40)
ANION GAP SERPL CALCULATED.3IONS-SCNC: 13 MMOL/L (ref 4–16)
AST SERPL-CCNC: 44 IU/L (ref 15–37)
BASOPHILS ABSOLUTE: 0.1 K/CU MM
BASOPHILS RELATIVE PERCENT: 0.6 % (ref 0–1)
BILIRUB SERPL-MCNC: 0.3 MG/DL (ref 0–1)
BUN SERPL-MCNC: 7 MG/DL (ref 6–23)
CALCIUM SERPL-MCNC: 9.6 MG/DL (ref 8.3–10.6)
CHLORIDE BLD-SCNC: 95 MMOL/L (ref 99–110)
CHOLEST SERPL-MCNC: 260 MG/DL
CO2: 28 MMOL/L (ref 21–32)
CREAT SERPL-MCNC: 0.5 MG/DL (ref 0.6–1.1)
DIFFERENTIAL TYPE: ABNORMAL
EOSINOPHILS ABSOLUTE: 0.2 K/CU MM
EOSINOPHILS RELATIVE PERCENT: 1.5 % (ref 0–3)
ESTIMATED AVERAGE GLUCOSE: 252 MG/DL
GFR SERPL CREATININE-BSD FRML MDRD: >60 ML/MIN/1.73M2
GLUCOSE SERPL-MCNC: 274 MG/DL (ref 70–99)
HBA1C MFR BLD: 10.4 % (ref 4.2–6.3)
HCT VFR BLD CALC: 46.9 % (ref 37–47)
HDLC SERPL-MCNC: 43 MG/DL
HEMOGLOBIN: 14.8 GM/DL (ref 12.5–16)
IMMATURE NEUTROPHIL %: 0.5 % (ref 0–0.43)
LDLC SERPL CALC-MCNC: 185 MG/DL
LYMPHOCYTES ABSOLUTE: 3.7 K/CU MM
LYMPHOCYTES RELATIVE PERCENT: 35.4 % (ref 24–44)
MCH RBC QN AUTO: 26.9 PG (ref 27–31)
MCHC RBC AUTO-ENTMCNC: 31.6 % (ref 32–36)
MCV RBC AUTO: 85.3 FL (ref 78–100)
MONOCYTES ABSOLUTE: 0.7 K/CU MM
MONOCYTES RELATIVE PERCENT: 6.8 % (ref 0–4)
NUCLEATED RBC %: 0 %
PDW BLD-RTO: 13.3 % (ref 11.7–14.9)
PLATELET # BLD: 302 K/CU MM (ref 140–440)
PMV BLD AUTO: 10.2 FL (ref 7.5–11.1)
POTASSIUM SERPL-SCNC: 4.5 MMOL/L (ref 3.5–5.1)
RBC # BLD: 5.5 M/CU MM (ref 4.2–5.4)
SEGMENTED NEUTROPHILS ABSOLUTE COUNT: 5.8 K/CU MM
SEGMENTED NEUTROPHILS RELATIVE PERCENT: 55.2 % (ref 36–66)
SODIUM BLD-SCNC: 136 MMOL/L (ref 135–145)
T4 FREE SERPL-MCNC: 1.26 NG/DL (ref 0.9–1.8)
TOTAL IMMATURE NEUTOROPHIL: 0.05 K/CU MM
TOTAL NUCLEATED RBC: 0 K/CU MM
TOTAL PROTEIN: 7.2 GM/DL (ref 6.4–8.2)
TRIGL SERPL-MCNC: 160 MG/DL
TSH SERPL DL<=0.005 MIU/L-ACNC: 1.18 UIU/ML (ref 0.27–4.2)
WBC # BLD: 10.5 K/CU MM (ref 4–10.5)

## 2023-02-17 PROCEDURE — 85025 COMPLETE CBC W/AUTO DIFF WBC: CPT

## 2023-02-17 PROCEDURE — 80053 COMPREHEN METABOLIC PANEL: CPT

## 2023-02-17 PROCEDURE — 36415 COLL VENOUS BLD VENIPUNCTURE: CPT

## 2023-02-17 PROCEDURE — 83036 HEMOGLOBIN GLYCOSYLATED A1C: CPT

## 2023-02-17 PROCEDURE — 80061 LIPID PANEL: CPT

## 2023-02-17 PROCEDURE — 84443 ASSAY THYROID STIM HORMONE: CPT

## 2023-02-17 PROCEDURE — 71046 X-RAY EXAM CHEST 2 VIEWS: CPT

## 2023-02-17 PROCEDURE — 84439 ASSAY OF FREE THYROXINE: CPT

## 2023-03-20 ENCOUNTER — ANCILLARY PROCEDURE (OUTPATIENT)
Dept: MAMMOGRAPHY | Facility: CLINIC | Age: 68
End: 2023-03-20
Attending: FAMILY MEDICINE
Payer: COMMERCIAL

## 2023-03-20 DIAGNOSIS — Z12.31 VISIT FOR SCREENING MAMMOGRAM: ICD-10-CM

## 2023-03-20 PROCEDURE — 77067 SCR MAMMO BI INCL CAD: CPT

## 2023-05-25 ENCOUNTER — PATIENT OUTREACH (OUTPATIENT)
Dept: CARE COORDINATION | Facility: CLINIC | Age: 68
End: 2023-05-25
Payer: COMMERCIAL

## 2023-06-09 ENCOUNTER — PATIENT OUTREACH (OUTPATIENT)
Dept: CARE COORDINATION | Facility: CLINIC | Age: 68
End: 2023-06-09
Payer: COMMERCIAL

## 2023-08-06 ENCOUNTER — HEALTH MAINTENANCE LETTER (OUTPATIENT)
Age: 68
End: 2023-08-06

## 2024-02-15 ENCOUNTER — HOSPITAL ENCOUNTER (EMERGENCY)
Age: 69
Discharge: HOME | End: 2024-02-15
Payer: MEDICARE

## 2024-02-15 VITALS
HEIGHT: 59 IN | RESPIRATION RATE: 16 BRPM | OXYGEN SATURATION: 96 % | BODY MASS INDEX: 30.24 KG/M2 | HEART RATE: 76 BPM | DIASTOLIC BLOOD PRESSURE: 83 MMHG | TEMPERATURE: 97.7 F | SYSTOLIC BLOOD PRESSURE: 170 MMHG | WEIGHT: 150 LBS

## 2024-02-15 DIAGNOSIS — M54.31 SCIATICA OF RIGHT SIDE: Primary | ICD-10-CM

## 2024-02-15 PROCEDURE — 96372 THER/PROPH/DIAG INJ SC/IM: CPT

## 2024-02-15 PROCEDURE — 99284 EMERGENCY DEPT VISIT MOD MDM: CPT

## 2024-02-15 PROCEDURE — 6360000002 HC RX W HCPCS: Performed by: NURSE PRACTITIONER

## 2024-02-15 RX ORDER — PREDNISONE 20 MG/1
20 TABLET ORAL 2 TIMES DAILY
Qty: 10 TABLET | Refills: 0 | Status: SHIPPED | OUTPATIENT
Start: 2024-02-15 | End: 2024-02-20

## 2024-02-15 RX ADMIN — HYDROMORPHONE HYDROCHLORIDE 1 MG: 1 INJECTION, SOLUTION INTRAMUSCULAR; INTRAVENOUS; SUBCUTANEOUS at 11:35

## 2024-02-15 NOTE — DISCHARGE INSTR - COC
Continuity of Care Form    Patient Name: Luna Roy   :  1955  MRN:  [id number]    Admit date:  2/15/2024  Discharge date:  ***    Code Status Order: Prior   Advance Directives:     Admitting Physician:  No admitting provider for patient encounter.  PCP: Vinay Murray MD    Discharging Nurse: ***  Discharging Hospital Unit/Room#: ED03/ED-03  Discharging Unit Phone Number: ***    Emergency Contact:   Extended Emergency Contact Information  Primary Emergency Contact: [place] [address] [facility]  Home Phone: [phone]  Relation: Brother/Sister    Past Surgical History:  Past Surgical History:   Procedure Laterality Date    BACK SURGERY      Lower Back, No Hardware    ENDOSCOPY, COLON, DIAGNOSTIC  Last Done 3-17    EYE SURGERY Bilateral     Cataracts With Lens Implants    HERNIA REPAIR      Abdominal Hernia Repair    HYSTERECTOMY VAGINAL      KNEE SURGERY Right     Benign \"Tumor\" Removed Right Knee    LEG SURGERY Right     \"Mole Removed From Back Of Right Calf, Became Infected , They Had To Go Back In\"    OTHER SURGICAL HISTORY      \"Tubes And Ovaries Removed In \"    ROTATOR CUFF REPAIR Right     UPPER GASTROINTESTINAL ENDOSCOPY  2017    With Dilation    UPPER GASTROINTESTINAL ENDOSCOPY N/A 3/22/2022    EGD BIOPSY performed by Hernandez Khalil MD at [facility] ENDOSCOPY       Immunization History:     There is no immunization history on file for this patient.    Active Problems:  Patient Active Problem List   Diagnosis Code    Melena K92.1    Left lower quadrant abdominal pain R10.32    Gastritis without bleeding K29.70    Type 2 diabetes mellitus, without long-term current use of insulin (HCC) E11.9    Primary hypertension I10       Isolation/Infection:   Isolation            No Isolation          Patient Infection Status       None to display            Nurse Assessment:  Last Vital Signs: BP (!) 170/83

## 2024-02-15 NOTE — ED PROVIDER NOTES
[facility] EMERGENCY DEPARTMENT  EMERGENCY DEPARTMENT ENCOUNTER        Pt Name: Luna Roy  MRN: [id number]  Birthdate 1955  Date of evaluation: 2/15/2024  Provider: MARCE SINGH - CNP  PCP: Vinay Murray MD    GREG. I have evaluated this patient.        Triage CHIEF COMPLAINT       Chief Complaint   Patient presents with    Hip Pain     Right hip pain started 1 week ago, NKI.          HISTORY OF PRESENT ILLNESS      Chief Complaint: right hip pain    Luna Roy is a 68 y.o. female who presents for evaluation of right hip and leg pain.  Patient has a long history of degenerative disc disease, follows with pain management and reports that she had a caudal injection 1-2 weeks ago with pain management.  Pain started about a week ago.  She reports that it is worse with certain movements and she gets a sharp stabbing pain that goes down her hip into her toes.  She does have kind of a constant ache in her hip which was present before the caudal injection but has gotten worse.  Denies any numbness or paresthesias, saddle anesthesia, changes in bowel or bladder function including retention or incontinence, lower extremity weakness.  Denies any fever, chills, nausea, vomiting, abdominal pain or urinary symptoms.    Nursing Notes were all reviewed and agreed with or any disagreements were addressed in the HPI.    REVIEW OF SYSTEMS     Pertinent ROS as noted in HPI.      PAST MEDICAL HISTORY     Past Medical History:   Diagnosis Date    Anxiety     Bipolar disorder (HCC)     Choking     \"Been Choking On Food For 6 Months\"    Chronic back pain     Depression     Diabetes mellitus (HCC) Dx 2014    Difficult intubation     Fibromyalgia     History of echocardiogram 03/05/2021    EF 75-80%    History of stress test 03/10/2021    NORMAL STUDY    Hx of cardiovascular stress test 03/10/2021    Normal study    Hyperlipidemia     Hypertension     Panic

## 2024-08-20 ENCOUNTER — HOSPITAL ENCOUNTER (OUTPATIENT)
Dept: GENERAL RADIOLOGY | Age: 69
Discharge: HOME | End: 2024-08-20
Payer: MEDICARE

## 2024-08-20 ENCOUNTER — HOSPITAL ENCOUNTER (OUTPATIENT)
Age: 69
Discharge: HOME | End: 2024-08-20
Payer: MEDICARE

## 2024-08-20 DIAGNOSIS — M17.9 OSTEOARTHRITIS OF KNEE, UNSPECIFIED: ICD-10-CM

## 2024-08-20 PROCEDURE — 73562 X-RAY EXAM OF KNEE 3: CPT

## 2024-09-29 ENCOUNTER — HEALTH MAINTENANCE LETTER (OUTPATIENT)
Age: 69
End: 2024-09-29

## 2024-10-10 ENCOUNTER — PATIENT OUTREACH (OUTPATIENT)
Dept: CARE COORDINATION | Facility: CLINIC | Age: 69
End: 2024-10-10
Payer: COMMERCIAL

## 2024-10-15 ENCOUNTER — ANCILLARY PROCEDURE (OUTPATIENT)
Dept: MAMMOGRAPHY | Facility: CLINIC | Age: 69
End: 2024-10-15
Attending: NURSE PRACTITIONER
Payer: COMMERCIAL

## 2024-10-15 DIAGNOSIS — Z12.31 VISIT FOR SCREENING MAMMOGRAM: ICD-10-CM

## 2024-10-15 PROCEDURE — 77063 BREAST TOMOSYNTHESIS BI: CPT

## 2024-10-21 ENCOUNTER — PATIENT OUTREACH (OUTPATIENT)
Dept: CARE COORDINATION | Facility: CLINIC | Age: 69
End: 2024-10-21
Payer: COMMERCIAL

## 2024-10-31 ENCOUNTER — HOSPITAL ENCOUNTER (OUTPATIENT)
Dept: BONE DENSITY | Facility: HOSPITAL | Age: 69
Discharge: HOME | End: 2024-10-31
Attending: NURSE PRACTITIONER | Admitting: NURSE PRACTITIONER
Payer: COMMERCIAL

## 2024-10-31 DIAGNOSIS — Z13.820 ENCOUNTER FOR SCREENING FOR OSTEOPOROSIS: ICD-10-CM

## 2024-10-31 PROCEDURE — 77080 DXA BONE DENSITY AXIAL: CPT | Mod: GA

## 2024-12-07 ENCOUNTER — HEALTH MAINTENANCE LETTER (OUTPATIENT)
Age: 69
End: 2024-12-07

## 2025-04-06 ENCOUNTER — HEALTH MAINTENANCE LETTER (OUTPATIENT)
Age: 70
End: 2025-04-06

## 2025-04-17 ENCOUNTER — OFFICE VISIT (OUTPATIENT)
Dept: FAMILY MEDICINE CLINIC | Age: 70
End: 2025-04-17

## 2025-04-17 VITALS
SYSTOLIC BLOOD PRESSURE: 156 MMHG | HEIGHT: 59 IN | DIASTOLIC BLOOD PRESSURE: 68 MMHG | HEART RATE: 72 BPM | BODY MASS INDEX: 29.53 KG/M2 | OXYGEN SATURATION: 96 % | WEIGHT: 146.5 LBS

## 2025-04-17 DIAGNOSIS — F41.9 ANXIETY: ICD-10-CM

## 2025-04-17 DIAGNOSIS — I10 PRIMARY HYPERTENSION: ICD-10-CM

## 2025-04-17 DIAGNOSIS — E11.9 TYPE 2 DIABETES MELLITUS WITHOUT COMPLICATION, WITHOUT LONG-TERM CURRENT USE OF INSULIN (HCC): ICD-10-CM

## 2025-04-17 DIAGNOSIS — E11.9 TYPE 2 DIABETES MELLITUS WITHOUT COMPLICATION, WITHOUT LONG-TERM CURRENT USE OF INSULIN: Primary | ICD-10-CM

## 2025-04-17 RX ORDER — AMLODIPINE AND BENAZEPRIL HYDROCHLORIDE 5; 10 MG/1; MG/1
1 CAPSULE ORAL DAILY
Qty: 30 CAPSULE | Refills: 3 | Status: SHIPPED | OUTPATIENT
Start: 2025-04-17

## 2025-04-17 RX ORDER — PAROXETINE 10 MG/1
10 TABLET, FILM COATED ORAL EVERY MORNING
Qty: 30 TABLET | Refills: 3 | Status: SHIPPED | OUTPATIENT
Start: 2025-04-17

## 2025-04-17 SDOH — ECONOMIC STABILITY: FOOD INSECURITY: WITHIN THE PAST 12 MONTHS, YOU WORRIED THAT YOUR FOOD WOULD RUN OUT BEFORE YOU GOT MONEY TO BUY MORE.: SOMETIMES TRUE

## 2025-04-17 SDOH — ECONOMIC STABILITY: FOOD INSECURITY: WITHIN THE PAST 12 MONTHS, THE FOOD YOU BOUGHT JUST DIDN'T LAST AND YOU DIDN'T HAVE MONEY TO GET MORE.: SOMETIMES TRUE

## 2025-04-17 ASSESSMENT — PATIENT HEALTH QUESTIONNAIRE - PHQ9
SUM OF ALL RESPONSES TO PHQ QUESTIONS 1-9: 17
2. FEELING DOWN, DEPRESSED OR HOPELESS: MORE THAN HALF THE DAYS
9. THOUGHTS THAT YOU WOULD BE BETTER OFF DEAD, OR OF HURTING YOURSELF: NOT AT ALL
SUM OF ALL RESPONSES TO PHQ QUESTIONS 1-9: 17
7. TROUBLE CONCENTRATING ON THINGS, SUCH AS READING THE NEWSPAPER OR WATCHING TELEVISION: NEARLY EVERY DAY
3. TROUBLE FALLING OR STAYING ASLEEP: NEARLY EVERY DAY
1. LITTLE INTEREST OR PLEASURE IN DOING THINGS: MORE THAN HALF THE DAYS
6. FEELING BAD ABOUT YOURSELF - OR THAT YOU ARE A FAILURE OR HAVE LET YOURSELF OR YOUR FAMILY DOWN: SEVERAL DAYS
8. MOVING OR SPEAKING SO SLOWLY THAT OTHER PEOPLE COULD HAVE NOTICED. OR THE OPPOSITE, BEING SO FIGETY OR RESTLESS THAT YOU HAVE BEEN MOVING AROUND A LOT MORE THAN USUAL: NOT AT ALL
SUM OF ALL RESPONSES TO PHQ QUESTIONS 1-9: 17
5. POOR APPETITE OR OVEREATING: NEARLY EVERY DAY
SUM OF ALL RESPONSES TO PHQ QUESTIONS 1-9: 17
10. IF YOU CHECKED OFF ANY PROBLEMS, HOW DIFFICULT HAVE THESE PROBLEMS MADE IT FOR YOU TO DO YOUR WORK, TAKE CARE OF THINGS AT HOME, OR GET ALONG WITH OTHER PEOPLE: NOT DIFFICULT AT ALL
4. FEELING TIRED OR HAVING LITTLE ENERGY: NEARLY EVERY DAY

## 2025-04-17 NOTE — PROGRESS NOTES
Outpatient Clinic Visit Note    Patient: Luna Roy  : 1955 (69 y.o.)  Date: 2025    CC:  Chief Complaint   Patient presents with    Discuss Medications     Pt stated she was a patient about two years ago and want to discuss getting medication.        HPI: she was seen in my previous office, a bit over two years ago.  She had moved to the [facility], and lost her glucose monitor.  She has not been taking her meds for HTN or diabetes.    She smokes half pack daily.     Past Medical History:    Past Medical History:   Diagnosis Date    Anxiety     Bipolar disorder     Choking     \"Been Choking On Food For 6 Months\"    Chronic back pain     Depression     Diabetes mellitus (HCC) Dx     Difficult intubation     Fibromyalgia     History of echocardiogram 2021    EF 75-80%    History of stress test 03/10/2021    NORMAL STUDY    Hx of cardiovascular stress test 03/10/2021    Normal study    Hyperlipidemia     Hypertension     Panic attacks     Pneumonia Dx     PTSD (post-traumatic stress disorder)     Shortness of breath        Past Surgical History:  Past Surgical History:   Procedure Laterality Date    BACK SURGERY      Lower Back, No Hardware    ENDOSCOPY, COLON, DIAGNOSTIC  Last Done 3-17    EYE SURGERY Bilateral     Cataracts With Lens Implants    HERNIA REPAIR  's    Abdominal Hernia Repair    HYSTERECTOMY VAGINAL      KNEE SURGERY Right     Benign \"Tumor\" Removed Right Knee    LEG SURGERY Right     \"Mole Removed From Back Of Right Calf, Became Infected , They Had To Go Back In\"    OTHER SURGICAL HISTORY      \"Tubes And Ovaries Removed In \"    ROTATOR CUFF REPAIR Right 2014    UPPER GASTROINTESTINAL ENDOSCOPY  2017    With Dilation    UPPER GASTROINTESTINAL ENDOSCOPY N/A 3/22/2022    EGD BIOPSY performed by Hernandez Khalil MD at [facility] ENDOSCOPY       Home Medications:  Current Outpatient Medications   Medication Sig Dispense Refill

## 2025-04-18 ENCOUNTER — RESULTS FOLLOW-UP (OUTPATIENT)
Dept: FAMILY MEDICINE CLINIC | Age: 70
End: 2025-04-18

## 2025-04-18 LAB
ALBUMIN SERPL-MCNC: 4.7 G/DL (ref 3.4–5)
ALBUMIN/GLOB SERPL: 2.1 {RATIO} (ref 1.1–2.2)
ALP SERPL-CCNC: 128 U/L (ref 40–129)
ALT SERPL-CCNC: 15 U/L (ref 10–40)
ANION GAP SERPL CALCULATED.3IONS-SCNC: 13 MMOL/L (ref 3–16)
AST SERPL-CCNC: 16 U/L (ref 15–37)
BASOPHILS # BLD: 0.1 K/UL (ref 0–0.2)
BASOPHILS NFR BLD: 0.9 %
BILIRUB SERPL-MCNC: 0.3 MG/DL (ref 0–1)
BUN SERPL-MCNC: 12 MG/DL (ref 7–20)
CALCIUM SERPL-MCNC: 9.6 MG/DL (ref 8.3–10.6)
CHLORIDE SERPL-SCNC: 97 MMOL/L (ref 99–110)
CO2 SERPL-SCNC: 27 MMOL/L (ref 21–32)
CREAT SERPL-MCNC: 0.5 MG/DL (ref 0.6–1.2)
DEPRECATED RDW RBC AUTO: 14.3 % (ref 12.4–15.4)
EOSINOPHIL # BLD: 0.1 K/UL (ref 0–0.6)
EOSINOPHIL NFR BLD: 1.6 %
EST. AVERAGE GLUCOSE BLD GHB EST-MCNC: 274.7 MG/DL
GFR SERPLBLD CREATININE-BSD FMLA CKD-EPI: >90 ML/MIN/{1.73_M2}
GLUCOSE SERPL-MCNC: 271 MG/DL (ref 70–99)
HBA1C MFR BLD: 11.2 %
HCT VFR BLD AUTO: 43.5 % (ref 36–48)
HGB BLD-MCNC: 14.5 G/DL (ref 12–16)
LYMPHOCYTES # BLD: 4.2 K/UL (ref 1–5.1)
LYMPHOCYTES NFR BLD: 46.1 %
MCH RBC QN AUTO: 27.7 PG (ref 26–34)
MCHC RBC AUTO-ENTMCNC: 33.3 G/DL (ref 31–36)
MCV RBC AUTO: 83 FL (ref 80–100)
MONOCYTES # BLD: 0.6 K/UL (ref 0–1.3)
MONOCYTES NFR BLD: 6.5 %
NEUTROPHILS # BLD: 4.1 K/UL (ref 1.7–7.7)
NEUTROPHILS NFR BLD: 44.9 %
PLATELET # BLD AUTO: 237 K/UL (ref 135–450)
PMV BLD AUTO: 9.4 FL (ref 5–10.5)
POTASSIUM SERPL-SCNC: 4.5 MMOL/L (ref 3.5–5.1)
PROT SERPL-MCNC: 6.9 G/DL (ref 6.4–8.2)
RBC # BLD AUTO: 5.24 M/UL (ref 4–5.2)
SODIUM SERPL-SCNC: 137 MMOL/L (ref 136–145)
TSH SERPL DL<=0.005 MIU/L-ACNC: 1.7 UIU/ML (ref 0.27–4.2)
WBC # BLD AUTO: 9.1 K/UL (ref 4–11)

## 2025-05-28 ENCOUNTER — OFFICE VISIT (OUTPATIENT)
Dept: FAMILY MEDICINE CLINIC | Age: 70
End: 2025-05-28
Payer: MEDICARE

## 2025-05-28 VITALS
DIASTOLIC BLOOD PRESSURE: 72 MMHG | WEIGHT: 144 LBS | OXYGEN SATURATION: 98 % | BODY MASS INDEX: 30.23 KG/M2 | SYSTOLIC BLOOD PRESSURE: 148 MMHG | HEART RATE: 70 BPM | HEIGHT: 58 IN

## 2025-05-28 DIAGNOSIS — E11.9 TYPE 2 DIABETES MELLITUS WITHOUT COMPLICATION, WITHOUT LONG-TERM CURRENT USE OF INSULIN (HCC): ICD-10-CM

## 2025-05-28 DIAGNOSIS — I10 PRIMARY HYPERTENSION: Primary | ICD-10-CM

## 2025-05-28 PROCEDURE — 1090F PRES/ABSN URINE INCON ASSESS: CPT | Performed by: INTERNAL MEDICINE

## 2025-05-28 PROCEDURE — 4004F PT TOBACCO SCREEN RCVD TLK: CPT | Performed by: INTERNAL MEDICINE

## 2025-05-28 PROCEDURE — 3077F SYST BP >= 140 MM HG: CPT | Performed by: INTERNAL MEDICINE

## 2025-05-28 PROCEDURE — 1159F MED LIST DOCD IN RCRD: CPT | Performed by: INTERNAL MEDICINE

## 2025-05-28 PROCEDURE — 1160F RVW MEDS BY RX/DR IN RCRD: CPT | Performed by: INTERNAL MEDICINE

## 2025-05-28 PROCEDURE — 3046F HEMOGLOBIN A1C LEVEL >9.0%: CPT | Performed by: INTERNAL MEDICINE

## 2025-05-28 PROCEDURE — 99214 OFFICE O/P EST MOD 30 MIN: CPT | Performed by: INTERNAL MEDICINE

## 2025-05-28 PROCEDURE — G8417 CALC BMI ABV UP PARAM F/U: HCPCS | Performed by: INTERNAL MEDICINE

## 2025-05-28 PROCEDURE — 1123F ACP DISCUSS/DSCN MKR DOCD: CPT | Performed by: INTERNAL MEDICINE

## 2025-05-28 PROCEDURE — 2022F DILAT RTA XM EVC RTNOPTHY: CPT | Performed by: INTERNAL MEDICINE

## 2025-05-28 PROCEDURE — G8399 PT W/DXA RESULTS DOCUMENT: HCPCS | Performed by: INTERNAL MEDICINE

## 2025-05-28 PROCEDURE — 3078F DIAST BP <80 MM HG: CPT | Performed by: INTERNAL MEDICINE

## 2025-05-28 PROCEDURE — 3017F COLORECTAL CA SCREEN DOC REV: CPT | Performed by: INTERNAL MEDICINE

## 2025-05-28 PROCEDURE — G8427 DOCREV CUR MEDS BY ELIG CLIN: HCPCS | Performed by: INTERNAL MEDICINE

## 2025-05-28 NOTE — PROGRESS NOTES
hours as needed Pain mgmt      amLODIPine-benazepril (LOTREL) 5-10 MG per capsule Take 1 capsule by mouth daily 30 capsule 3    PARoxetine (PAXIL) 10 MG tablet Take 1 tablet by mouth every morning 30 tablet 3    metFORMIN (GLUCOPHAGE) 500 MG tablet Take 1 tablet by mouth 2 times daily (with meals) 60 tablet 5    oxyCODONE-acetaminophen (PERCOCET) 7.5-325 MG per tablet Take 1 tablet by mouth as needed.      linagliptin (TRADJENTA) 5 MG tablet Take 1 tablet by mouth daily (Patient not taking: Reported on 5/28/2025) 30 tablet 5     No current facility-administered medications for this visit.        Allergies:    Sulfa antibiotics    Family History:   History reviewed. No pertinent family history.     ROS: A 10-organ Review Of Systems was obtained and otherwise unremarkable except as per HPI.    Data: Old records have been reviewed electronically.    PHYSICAL EXAM:  BP (!) 148/72   Pulse 70   Ht 1.473 m (4' 10\")   Wt 65.3 kg (144 lb)   SpO2 98%   BMI 30.10 kg/m²     Constitutional: She  is oriented to person, place, and time. She appears well-developed and well-nourished.   HENT:   Head: Normocephalic and atraumatic.   Eyes: Pupils are equal, round, and reactive to light. Conjunctivae and EOM are normal.   Neck: Normal range of motion. Neck supple. No JVD present.   Cardiovascular: Normal rate, regular rhythm, normal heart sounds and intact distal pulses.   Pulmonary/Chest: Effort normal and breath sounds normal.   Abdominal: Soft. Bowel sounds are normal. He exhibits no distension. There is no tenderness.   Musculoskeletal: Normal range of motion.   Neurological: She is alert and oriented to person, place, and time.   Skin: Skin is warm and dry. Capillary refill takes less than 2 seconds.   Psychiatric: She has a normal mood and affect.  Her behavior is normal. Judgment and thought content normal.     Assessment & Plan     Diagnosis Orders   1. Primary hypertension        2. Type 2 diabetes mellitus without

## 2025-05-30 ENCOUNTER — TELEPHONE (OUTPATIENT)
Dept: FAMILY MEDICINE CLINIC | Age: 70
End: 2025-05-30

## 2025-06-02 ENCOUNTER — TELEPHONE (OUTPATIENT)
Dept: FAMILY MEDICINE CLINIC | Age: 70
End: 2025-06-02

## 2025-06-02 NOTE — TELEPHONE ENCOUNTER
Re:    linagliptin (TRADJENTA) 5 MG tablet     This needs a Prior Authorization or patient needs something comparable ----it is $400.00 to  and she can not possibly afford that.    Please call her with what you want her to do.      Patient wants you to know her   Hemoglobin A1C is 11.2  11.2

## 2025-07-01 ENCOUNTER — OFFICE VISIT (OUTPATIENT)
Dept: FAMILY MEDICINE CLINIC | Age: 70
End: 2025-07-01
Payer: MEDICARE

## 2025-07-01 VITALS
SYSTOLIC BLOOD PRESSURE: 136 MMHG | OXYGEN SATURATION: 96 % | RESPIRATION RATE: 18 BRPM | DIASTOLIC BLOOD PRESSURE: 72 MMHG | WEIGHT: 144.2 LBS | BODY MASS INDEX: 29.07 KG/M2 | HEIGHT: 59 IN | HEART RATE: 80 BPM

## 2025-07-01 DIAGNOSIS — E11.9 TYPE 2 DIABETES MELLITUS WITHOUT COMPLICATION, WITHOUT LONG-TERM CURRENT USE OF INSULIN (HCC): ICD-10-CM

## 2025-07-01 DIAGNOSIS — I10 PRIMARY HYPERTENSION: Primary | ICD-10-CM

## 2025-07-01 PROCEDURE — 3017F COLORECTAL CA SCREEN DOC REV: CPT | Performed by: INTERNAL MEDICINE

## 2025-07-01 PROCEDURE — 1123F ACP DISCUSS/DSCN MKR DOCD: CPT | Performed by: INTERNAL MEDICINE

## 2025-07-01 PROCEDURE — 3075F SYST BP GE 130 - 139MM HG: CPT | Performed by: INTERNAL MEDICINE

## 2025-07-01 PROCEDURE — G8417 CALC BMI ABV UP PARAM F/U: HCPCS | Performed by: INTERNAL MEDICINE

## 2025-07-01 PROCEDURE — 99214 OFFICE O/P EST MOD 30 MIN: CPT | Performed by: INTERNAL MEDICINE

## 2025-07-01 PROCEDURE — 3046F HEMOGLOBIN A1C LEVEL >9.0%: CPT | Performed by: INTERNAL MEDICINE

## 2025-07-01 PROCEDURE — 1159F MED LIST DOCD IN RCRD: CPT | Performed by: INTERNAL MEDICINE

## 2025-07-01 PROCEDURE — 1090F PRES/ABSN URINE INCON ASSESS: CPT | Performed by: INTERNAL MEDICINE

## 2025-07-01 PROCEDURE — 1160F RVW MEDS BY RX/DR IN RCRD: CPT | Performed by: INTERNAL MEDICINE

## 2025-07-01 PROCEDURE — G8399 PT W/DXA RESULTS DOCUMENT: HCPCS | Performed by: INTERNAL MEDICINE

## 2025-07-01 PROCEDURE — G8427 DOCREV CUR MEDS BY ELIG CLIN: HCPCS | Performed by: INTERNAL MEDICINE

## 2025-07-01 PROCEDURE — 4004F PT TOBACCO SCREEN RCVD TLK: CPT | Performed by: INTERNAL MEDICINE

## 2025-07-01 PROCEDURE — 3078F DIAST BP <80 MM HG: CPT | Performed by: INTERNAL MEDICINE

## 2025-07-01 PROCEDURE — 2022F DILAT RTA XM EVC RTNOPTHY: CPT | Performed by: INTERNAL MEDICINE

## 2025-07-01 RX ORDER — GLIMEPIRIDE 1 MG/1
1 TABLET ORAL
Qty: 30 TABLET | Refills: 3 | Status: SHIPPED | OUTPATIENT
Start: 2025-07-01

## 2025-07-01 NOTE — PROGRESS NOTES
Orders   1. Primary hypertension        2. Type 2 diabetes mellitus without complication, without long-term current use of insulin (HCC)            HTN-- doing well with current meds.     Diabetes-  will give amaryl and follow, she has made dietary changes, may need to check her glucometer to make sure it is accurate.     She is a smoker, will need screening CT chest in the future, her sister Patsy has lung cancer.     Return in about 2 months (around 9/1/2025).     Vinay Murray MD, 7/1/2025 1:44 PM

## (undated) DEVICE — ENDOSCOPY KIT: Brand: MEDLINE INDUSTRIES, INC.

## (undated) DEVICE — Z DISCONTINUED (USE MFG CAT MVABO)  TUBING GAS SAMPLING STD 6.5 FT FEMALE CONN SMRT CAPNOLINE

## (undated) DEVICE — FORCEPS BX L240CM JAW DIA2.8MM L CAP W/ NDL MIC MESH TOOTH